# Patient Record
Sex: FEMALE | Race: WHITE | Employment: FULL TIME | URBAN - METROPOLITAN AREA
[De-identification: names, ages, dates, MRNs, and addresses within clinical notes are randomized per-mention and may not be internally consistent; named-entity substitution may affect disease eponyms.]

---

## 2019-10-09 ENCOUNTER — HOSPITAL ENCOUNTER (OUTPATIENT)
Dept: MAMMOGRAPHY | Age: 39
Discharge: HOME | End: 2019-10-09
Attending: NURSE PRACTITIONER
Payer: COMMERCIAL

## 2019-10-09 DIAGNOSIS — Z12.31 ENCOUNTER FOR SCREENING MAMMOGRAM FOR BREAST CANCER: ICD-10-CM

## 2019-10-09 PROCEDURE — 77063 BREAST TOMOSYNTHESIS BI: CPT

## 2020-12-28 ENCOUNTER — TRANSCRIBE ORDER (OUTPATIENT)
Dept: SCHEDULING | Age: 40
End: 2020-12-28

## 2020-12-28 DIAGNOSIS — Z12.31 ENCOUNTER FOR SCREENING MAMMOGRAM FOR MALIGNANT NEOPLASM OF BREAST: Primary | ICD-10-CM

## 2021-02-04 ENCOUNTER — TRANSCRIBE ORDER (OUTPATIENT)
Dept: SCHEDULING | Age: 41
End: 2021-02-04

## 2021-02-04 DIAGNOSIS — Z12.31 SCREENING MAMMOGRAM FOR HIGH-RISK PATIENT: Primary | ICD-10-CM

## 2021-04-12 ENCOUNTER — HOSPITAL ENCOUNTER (OUTPATIENT)
Dept: MAMMOGRAPHY | Age: 41
Discharge: HOME | End: 2021-04-12
Payer: COMMERCIAL

## 2021-04-12 DIAGNOSIS — Z12.31 SCREENING MAMMOGRAM FOR HIGH-RISK PATIENT: ICD-10-CM

## 2021-04-12 PROCEDURE — 77063 BREAST TOMOSYNTHESIS BI: CPT

## 2022-05-19 ENCOUNTER — TRANSCRIBE ORDER (OUTPATIENT)
Dept: SCHEDULING | Age: 42
End: 2022-05-19

## 2022-05-19 ENCOUNTER — HOSPITAL ENCOUNTER (OUTPATIENT)
Dept: MAMMOGRAPHY | Age: 42
Discharge: HOME | End: 2022-05-19
Attending: INTERNAL MEDICINE
Payer: COMMERCIAL

## 2022-05-19 DIAGNOSIS — Z12.31 VISIT FOR SCREENING MAMMOGRAM: Primary | ICD-10-CM

## 2022-05-19 DIAGNOSIS — Z12.31 VISIT FOR SCREENING MAMMOGRAM: ICD-10-CM

## 2022-05-19 PROCEDURE — 77063 BREAST TOMOSYNTHESIS BI: CPT

## 2022-10-29 ENCOUNTER — APPOINTMENT (OUTPATIENT)
Dept: GENERAL RADIOLOGY | Age: 42
End: 2022-10-29
Payer: COMMERCIAL

## 2022-10-29 ENCOUNTER — HOSPITAL ENCOUNTER (EMERGENCY)
Age: 42
Discharge: HOME | End: 2022-10-29
Attending: EMERGENCY MEDICINE
Payer: COMMERCIAL

## 2022-10-29 VITALS
OXYGEN SATURATION: 99 % | HEIGHT: 66 IN | DIASTOLIC BLOOD PRESSURE: 96 MMHG | RESPIRATION RATE: 18 BRPM | SYSTOLIC BLOOD PRESSURE: 128 MMHG | WEIGHT: 230 LBS | TEMPERATURE: 98.5 F | HEART RATE: 95 BPM | BODY MASS INDEX: 36.96 KG/M2

## 2022-10-29 DIAGNOSIS — S82.891A CLOSED FRACTURE OF RIGHT ANKLE, INITIAL ENCOUNTER: Primary | ICD-10-CM

## 2022-10-29 PROCEDURE — 73610 X-RAY EXAM OF ANKLE: CPT

## 2022-10-29 PROCEDURE — 99283 EMERGENCY DEPT VISIT LOW MDM: CPT

## 2022-10-29 RX ORDER — HYDROCODONE BITARTRATE AND ACETAMINOPHEN 5; 325 MG/1; MG/1
1 TABLET ORAL EVERY 6 HOURS PRN
Qty: 10 TABLET | Refills: 0 | Status: SHIPPED | OUTPATIENT
Start: 2022-10-29 | End: 2022-11-01

## 2022-10-29 ASSESSMENT — PAIN DESCRIPTION - ORIENTATION: ORIENTATION: RIGHT

## 2022-10-29 ASSESSMENT — PAIN DESCRIPTION - DESCRIPTORS: DESCRIPTORS: DISCOMFORT

## 2022-10-29 ASSESSMENT — PAIN - FUNCTIONAL ASSESSMENT: PAIN_FUNCTIONAL_ASSESSMENT: 0-10

## 2022-10-29 ASSESSMENT — ENCOUNTER SYMPTOMS: SHORTNESS OF BREATH: 0

## 2022-10-29 ASSESSMENT — PAIN SCALES - GENERAL: PAINLEVEL_OUTOF10: 7

## 2022-10-29 ASSESSMENT — PAIN DESCRIPTION - LOCATION: LOCATION: ANKLE

## 2022-10-29 NOTE — ED PROVIDER NOTES
chief complaint of    Chief Complaint   Patient presents with    Ankle Pain      25-year-old female presents emergency department today with complaint of right ankle pain. Patient states that she stepped in a hole, twisting her right ankle. She denies any other injury. Pain is worse with ambulation. Pain is improved at rest.  She denies any treatment prior to arrival.    The history is provided by the patient. Review of Systems   Constitutional:  Negative for fever. Respiratory:  Negative for shortness of breath. Cardiovascular:  Negative for chest pain. Musculoskeletal:  Positive for arthralgias. All other systems reviewed and are negative. Past Medical History:   Diagnosis Date    Hypertension     Psychiatric disorder     ADD        Past Surgical History:   Procedure Laterality Date    CHOLECYSTECTOMY      GYN      C-sec- had SA    HEENT      tonsils        Family History   Problem Relation Age of Onset    Hypertension Mother     Colon Cancer Neg Hx     Cancer Mother 36        Breast cancer    Heart Disease Father     Breast Cancer Mother 36    Ovarian Cancer Neg Hx         Social History     Socioeconomic History    Marital status:    Tobacco Use    Smoking status: Never    Smokeless tobacco: Never   Substance and Sexual Activity    Alcohol use: No    Drug use: No         Sulfa antibiotics and Nickel     Discharge Medication List as of 10/29/2022  5:35 PM        CONTINUE these medications which have NOT CHANGED    Details   Lisdexamfetamine Dimesylate 60 MG CAPS Take 1 tablet by mouth. Historical Med      lisinopril (PRINIVIL;ZESTRIL) 20 MG tablet Take by mouth dailyHistorical Med      norgestimate-ethinyl estradiol (ORTHO-CYCLEN) 0.25-35 MG-MCG per tablet Take 1 tablet by mouth dailyHistorical Med      promethazine (PHENERGAN) 12.5 MG tablet Take 12.5 mg by mouth every 6 hours as neededHistorical Med      rOPINIRole (REQUIP) 0.5 MG tablet Take 0.5 mg by mouth dailyHistorical Med Vitals signs and nursing note reviewed. Patient Vitals for the past 4 hrs:   Temp Pulse Resp BP SpO2   10/29/22 1503 98.5 °F (36.9 °C) 95 18 (!) 128/96 99 %          Physical Exam  Vitals and nursing note reviewed. Constitutional:       General: She is not in acute distress. Appearance: Normal appearance. She is well-developed. She is not ill-appearing, toxic-appearing or diaphoretic. HENT:      Head: Normocephalic and atraumatic. Mouth/Throat:      Mouth: Mucous membranes are moist.   Eyes:      General: No scleral icterus. Extraocular Movements: Extraocular movements intact. Cardiovascular:      Rate and Rhythm: Normal rate. Pulses:           Dorsalis pedis pulses are 2+ on the right side. Posterior tibial pulses are 2+ on the right side. Pulmonary:      Effort: Pulmonary effort is normal. No respiratory distress. Abdominal:      General: Abdomen is flat. There is no distension. Musculoskeletal:      Right ankle: Swelling present. Tenderness present. Decreased range of motion. Normal pulse. Comments: Swelling and tenderness to the lateral right ankle. Range of motion is decreased secondary to pain. Patient has intact pedal pulses, capillary refill, and sensation to the right lower extremity. No wounds noted. Skin:     General: Skin is warm and dry. Capillary Refill: Capillary refill takes less than 2 seconds. Neurological:      General: No focal deficit present. Mental Status: She is alert and oriented to person, place, and time. Psychiatric:         Mood and Affect: Mood normal.         Behavior: Behavior normal.        Procedures    Results for orders placed or performed during the hospital encounter of 10/29/22   XR ANKLE RIGHT (MIN 3 VIEWS)    Narrative    Right ankle series    HISTORY:  Pain and swelling after fall    3 views were obtained. COMPARISON: None.     FINDINGS: There is a nondisplaced fracture at the tip of the lateral malleolus. There is moderate anterolateral soft tissue swelling. There is no significant  soft tissue swelling. There is a tiny plantar calcaneal spur. Impression    Nondisplaced fracture at the tip of the lateral malleolus. XR ANKLE RIGHT (MIN 3 VIEWS)   Final Result   Nondisplaced fracture at the tip of the lateral malleolus. Voice dictation software was used during the making of this note. This software is not perfect and grammatical and other typographical errors may be present. This note has not been completely proofread for errors.        Lakshmi Smith, SIMONE - [place]  10/29/22 0329

## 2022-10-29 NOTE — ED NOTES
I have reviewed discharge instructions with the patient and spouse. The patient and spouse verbalized understanding. Patient left ED via Discharge Method: wheelchair to Home with spouse    Opportunity for questions and clarification provided. Patient given 1 [employer]. To continue your aftercare when you leave the hospital, you may receive an automated call from our care team to check in on how you are doing. This is a free service and part of our promise to provide the best care and service to meet your aftercare needs.  If you have questions, or wish to unsubscribe from this service please call [phone]. Thank you for Choosing our [facility] Emergency Department.         Inna Mann RN  10/29/22 6233
No

## 2022-10-29 NOTE — DISCHARGE INSTRUCTIONS
Wear the boot when you are up in walking. Elevate your ankle when at rest.  Apply ice for 10 to 15 minutes every 3-4 hours. Take medication as prescribed if needed. Follow-up with orthopedics. Return to the emergency department for any new, worsening, or concerning symptoms.

## 2022-10-29 NOTE — ED TRIAGE NOTES
Patient to triage via wheelchair with mechanical fall and right ankle pain.  Reports \"I heard a pop\"

## 2022-10-29 NOTE — Clinical Note
Irene Connelly was seen and treated in our emergency department on 10/29/2022. She may return to work on 11/02/2022. She may return sooner if better. If you have any questions or concerns, please don't hesitate to call.       [place] Kathleen, APRN - CNP

## 2022-11-01 ENCOUNTER — OFFICE VISIT (OUTPATIENT)
Dept: ORTHOPEDIC SURGERY | Age: 42
End: 2022-11-01

## 2022-11-01 DIAGNOSIS — S99.911A INJURY OF RIGHT ANKLE, INITIAL ENCOUNTER: Primary | ICD-10-CM

## 2022-11-01 DIAGNOSIS — S82.61XA CLOSED DISPLACED FRACTURE OF LATERAL MALLEOLUS OF RIGHT FIBULA, INITIAL ENCOUNTER: ICD-10-CM

## 2022-11-01 NOTE — LETTER
[address],[facility] [address]  Phone: [phone]  Fax: [phone]    Anel Lion MD        November 1, 2022     Patient: Ahmet Serrano   YOB: 1980   Date of Visit: 11/1/2022       To Whom It May Concern: It is my medical opinion that Steven Osei Work Status: out of work from 10/31/22-11/06/22. If you have any questions or concerns, please don't hesitate to call.     Sincerely,        Anel Lion MD

## 2022-11-01 NOTE — PROGRESS NOTES
Name: Melisa Womack  YOB: 1980  Gender: female  MRN: [id number]    CC: Right ankle injury    HPI:   Chronic history of bilateral ankle sprains. Last sprains/injuries: Left 2014: Right 2019  10/29/2022: She reports injuring her ankle stepping in a hole  10/29/2022: [facility] ER: 3D walking boot  11/01/2022: She presents to assess her right ankle injury    ROS/Meds/PSH/PMH/FH/SH: reviewed today    Tobacco:  reports that she has never smoked. She has never used smokeless tobacco.     Physical Examination:  Patient appears to be alert and oriented with acceptable appearance. No obvious distress or SOB  CV: appears to have acceptable vascular color and capillary refill  Neuro: appears to have mostly intact light touch sensation   Skin: Right ankle swelling mainly lateral  MS: Standing: None: Gait 3D boot  Right = mild medial ankle/Deltoid pain  Right = majority lateral malleolar tip pain  Right = has ankle/foot motion; 5/5 strength; unable to fully stress test    XR: Right side: Standing AP lateral mortise ankle plus AP oblique foot taken on return  XR Impression:  As above      Reviewed Test/Records/Documents:   10/29/2022: [facility] ER treated right ankle injury with 3D boot  10/29/2022: [facility] ER x-ray radiologic impression: Nondisplaced fracture at the tip of the lateral malleolus  10/29/2022: 3 views right ankle nonweightbearing with distal lateral malleolar sleeve avulsion fracture  02/22/2014: Left ankle x-rays with distal tip lateral malleolar avulsion ossicle    Assessment:   Chronic bilateral ankle sprains/injuries  10/29/2022: Acute right ankle injury: Lateral malleolar sleeve avulsion fracture; mild Deltoid sprain    Plan:   The patient and I discussed the above assessment. We explored treatment options.      She reports chronic bilateral ankle sprains/injuries  At this time the plan is to treat her acute injury with plans in the future for rehab, possible MRI scan and possible surgery  Advanced medical imaging: No indication today for MRI scan    DME: She has a short 3D boot: Issued a Reparel sleeve and lace up ankle brace  We discussed ankle care and boot or brace protection  PT: She will need PT in the future  Orthotic/prosthetic: No indication for custom insoles       Medication - OTC meds prn:   Surgical discussion: Discussed potential future right lateral ankle reconstruction  Follow up: 3 weeks: X-rays ankle/foot  Work status: Out of work this week: She may begin regular work 11/07/2022  History and discussion of management with: Her     This note was created using Dragon voice recognition software which may result in errors of speech and spelling recognition and word/phrase syntax errors.

## 2022-11-01 NOTE — LETTER
DME Patient Authorization Form    Name: Rocky Walters  : 1980  MRN: [id number]   Age: 38 y.o. Gender: female  Delivery Address: [facility]     Diagnosis:     ICD-10-CM    1. Injury of right ankle, initial encounter  S99.911A       2. Closed displaced fracture of lateral malleolus of right fibula, initial encounter  S82. 61XA            Requested DME:  Tam Jacobs -  ($200.00) X 1 - right        Clinical Notes:     **Indicates non-covered items by insurance. Payment expected on date of service. Electronically signed by  Provider: Maximino Dominguez MD__Date: 2022                            [facility]/[address] Tax ID # [id number]        Durable Medical Equipment and/or Orthotics Patient Consent     I understand that my physician has prescribed this medical supply as part of my treatment plan as a matter of Medical Necessity.  I understand that I have a choice in where I receive my prescribed orthopedic supplies and/or services.  I authorize [facility] to furnish this service/product and to provide my insurance carrier with any information requested in order to process for payment.  I instruct my insurance carrier to pay [facility] directly for these services/products.  I understand that my insurance carrier may deny payment for this supply because it is a non-covered item, deemed not medically necessary or considered experimental.   I understand that any cost not covered by my insurance carrier will be solely my financial responsibility.  I have received the Supplier Standards and have reviewed them.  I have received the prescribed item and have been fully instructed on the proper use of the above services/products.    ______ (Patient Initials) I understand that all DME items are non-returnable after being dispensed.  Items still in sealed packaging may be your requests for treatment or service. You have the right to talk in confidence with health care providers and to have your health care information protected. You have the right to receive an explanation of your bill. You have the right to complain about the service or product you receive. Patient Responsibilities:  Please provide complete and accurate information about your health insurance benefits and make arrangements for the timely payment of your bill. [facility] will, if possible, assume responsibility for billing your insurance (Medicare, Medicaid and commercial) for the prescribed equipment or devices. If your policy does not cover the prescribed product, or only covers a portion of the bill, you are responsible for any remaining balance. Return and Exchange Policy:  [facility] will honor published  Warranties for products. [facility] will accept returns or exchanges within 14 days from the date of receipt, providin) the product must be in new condition; 2) receipt as required; and 3) used disposable and hygiene products may only be returned due to a defective product. Note: Refunds will be issued in a timely manner, please allow 4-6 weeks for processing. Complaint Procedures and DME Consumer Protection Resources:  [facility] values you as a customer, and is committed to resolving patient concerns. This commitment includes understanding and documenting your concerns, conducting a review of internal procedures, and providing you with an explanation and resolution to your concerns. Should you have any questions about our services or billing process, please contact our office at (practice phone number). If we are unable to resolve the concern, you have the right to direct comments to the office of Consumer Protection, in the [address] or the [facility] office, without fear of repercussion.     DMEPOS SUPPLIER STANDARDS    A supplier must be in compliance with all applicable Federal and [facility] and regulatory requirements. A supplier must provide complete and accurate information on the DMEPOS supplier application. Any changes to this information must be reported to the [facility] within 30 days. An authorized individual (one whose signature is binding) must sign the application for billing privileges. A supplier must fill orders from its own inventory, or must contract with other companies for the purchase of items necessary to fill the order. A supplier may not contract with any entity that is currently excluded from the Medicare program, any [facility] program, or from any other Federal procurement or Nonprocurement programs. A supplier must advise beneficiaries that they may rent or purchase inexpensive or routinely purchased durable medical equipment, and of the purchase option for capped rental equipment. A supplier must notify beneficiaries of warranty coverage and honor all warranties under applicable State Law, and repair or replace free of charge Medicare covered items that are under warranty. A supplier must maintain a physical facility on an appropriate site. A supplier must permit CMS, or its agents to conduct on-site inspections to ascertain the supplier's compliance with these standards. The supplier location must be accessible to beneficiaries during reasonable business hours, and must maintain a visible sign and posted hours of operation. A supplier must maintain a primary business telephone listed under the name of the business in a [employer] or a toll free number available through directory assistance. The exclusive use of a beeper, answering machine or cell phone is prohibited. A supplier must have comprehensive liability insurance in the amount of at least $300,000 that covers both the supplier's place of business and all customers and employees of the supplier.   If the supplier manufactures its own items, this insurance must also cover product liability and completed operations. A supplier must agree not to initiate telephone contact with beneficiaries, with a few exceptions allowed. This standard prohibits suppliers from calling beneficiaries in order to solicit new business. A supplier is responsible for delivery and must instruct beneficiaries on use of Medicare covered items, and maintain proof of delivery. A supplier must answer questions, and respond to complaints of the beneficiaries, and maintain documentation of such contacts. A supplier must maintain and replace at no charge or repair directly, or through a service contract with another company, Medicare covered items it has rented to beneficiaries. A supplier must accept returns of substandard (less than full quality for the particular item) or unsuitable items (inappropriate for the beneficiary at the time it was fitted and rented or sold) from beneficiaries. A supplier must disclose these supplier standards to each beneficiary to whom it supplies a Medicare-covered item. A supplier must disclose to the government any person having ownership, financial, or control interest in the supplier. A supplier must not convey or reassign a supplier number; i.e., the supplier may not sell or allow another entity to use its Medicare billing number. A supplier must have a complaint resolution protocol established to address beneficiary complaints that relate to these standards. A record of these complaints must be maintained at the physical facility. Complaint records must include: the name, address, telephone number and health insurance claim number of the beneficiary, a summary of the complaint, and any action taken to resolve it. A supplier must agree to furnish CMS any information required by the Medicare statute and implementing regulations.   A supplier of DMEPOS and other items and services must be accredited by a CMS-approved accreditation organization in order to receive and retain a supplier billing number. The accreditation must indicate the specific products and services, for which the supplier is accredited in order for the supplier to receive payment for those specific products and services. A DMEPOS supplier must notify their accreditation organization when a new DMEPOS location is opened. The accreditation organization may accredit the new supplier location for three months after it is operational without requiring a new site visit. All DMEPOS supplier locations, whether owned or subcontracted, must meet the [employer] and be separately accredited in order to bill Medicare. An accredited supplier may be denied enrollment or their enrollment may be revoked, if CMS determines that they are not in compliance with the DMEPOS quality standards. A DMEPOS supplier must disclose upon enrollment all products and services, including the addition of new product lines for which they are seeking accreditation. If a new product line is added after enrollment, the DMEPOS supplier will be responsible for notifying the accrediting body of the new product so that the DMEPOS supplier can be re-surveyed and accredited for these new products. Must meet the surety bond requirements specified in 42 C. F.R. 424.57(c). Implementation date- May 4, 2009. A supplier must obtain oxygen from a state-licensed oxygen supplier. A supplier must maintain ordering and referring documentation consistent with provisions found in 42 C. F.R. 424.516(f). DMEPOS suppliers are prohibited from sharing a practice location with certain other Medicare providers and suppliers. DMEPOS suppliers must remain open to the public for a minimum of 30 hours per week with certain exceptions. None

## 2022-11-02 NOTE — PROGRESS NOTES
Patient was fitted and instructed on Reparel Ankle Sleeve for the right foot. The patient was prescribed a Wraptor brace for the patient's rightfoot. The patient wears a size NA shoe and I fitted the patient with a L brace. I explained how to fit the brace properly by pulling the lace tabs across top of foot first then under arch and lastly pulling the strap up firmly and attaching to the lateral Velcro strip. Thus forming a figure 8 across the ankle joint. Once the figure 8 is completed they are to secure the top (short circumferential) straps to help avoid the straps from loosening with normal wear. The patient was able to demonstrate proper fitting in office to ensure compliance with device and acknowledged satisfaction with current fit. Patient read and signed documenting they understand and agree to [facility]'s current DME return policy.

## 2022-11-21 ENCOUNTER — OFFICE VISIT (OUTPATIENT)
Dept: ORTHOPEDIC SURGERY | Age: 42
End: 2022-11-21
Payer: COMMERCIAL

## 2022-11-21 DIAGNOSIS — S99.911A INJURY OF RIGHT ANKLE, INITIAL ENCOUNTER: Primary | ICD-10-CM

## 2022-11-21 DIAGNOSIS — S82.61XA CLOSED DISPLACED FRACTURE OF LATERAL MALLEOLUS OF RIGHT FIBULA, INITIAL ENCOUNTER: ICD-10-CM

## 2022-11-21 PROCEDURE — 99213 OFFICE O/P EST LOW 20 MIN: CPT | Performed by: ORTHOPAEDIC SURGERY

## 2022-11-21 NOTE — PROGRESS NOTES
Name: Elisa Lentz  YOB: 1980  Gender: female  MRN: [id number]    11/01/2022: Initial visit with me for[de-identified] Right ankle injury  11/21/2022: She presents full weightbearing 3D boot    HPI:   Chronic history of bilateral ankle sprains. Last sprains/injuries: Left 2014: Right 2019  10/29/2022: She reports injuring her ankle stepping in a hole  10[facility]/2022: [facility] 3D walking boot  11/01/2022: She presented to assess her right ankle injury    ROS/Meds/PSH/PMH/FH/SH: reviewed today    Tobacco:  reports that she has never smoked. She has never used smokeless tobacco.     Physical Examination:  Patient appears to be alert and oriented with acceptable appearance. No obvious distress or SOB  CV: appears to have acceptable vascular color and capillary refill  Neuro: appears to have mostly intact light touch sensation   Skin: Right lateral ankle swelling   MS: Standing: Mild bilateral varus posturing but no true cavovarus: Gait full in 3D boot  Right = distal tip lateral malleolar pain only; no syndesmotic pain; no medial pain; no foot pain    XR: Right side: Standing AP lateral mortise ankle plus AP oblique foot taken today with distal tip lateral malleolar sleeve avulsion fracture   XR Impression:  As above      Reviewed Test/Records/Documents:   10/29/2022: [facility] [place] right ankle injury with 3D boot  10/29/2022: [facility] x-ray radiologic impression: Nondisplaced fracture at the tip of the lateral malleolus  10/29/2022: 3 views right ankle nonweightbearing with distal lateral malleolar sleeve avulsion fracture  02/22/2014: Left ankle x-rays with distal tip lateral malleolar avulsion ossicle    Assessment:   Chronic bilateral ankle sprains/injuries  10/29/2022: Acute right ankle injury: Lateral malleolar sleeve avulsion fracture     Plan:   The patient and I discussed the above assessment. We explored treatment options.      I believe she will heal via ligament stability around her lateral malleolar sleeve fracture  She reports chronic bilateral ankle sprains/injuries and that may be due to her mild varus posturing  Potential future for Vasyli plus Ely insoles but hold today. At this time the plan is to treat her acute injury with plans in the future for rehab, possible MRI scan and possible surgery  Advanced medical imaging: No indication today for MRI scan    She will wean her short 3D boot into Reparel sleeve and lace up ankle brace  PT: No indication today  Orthotic/prosthetic: No indication for custom insoles; potential future Vasyli plus Ely    Medication - OTC meds prn:   Surgical discussion: Discussed potential future right lateral ankle reconstruction  Follow up: 4 weeks: X-rays ankle/foot  Work status: Regular    This note was created using Dragon voice recognition software which may result in errors of speech and spelling recognition and word/phrase syntax errors.

## 2022-12-21 ENCOUNTER — OFFICE VISIT (OUTPATIENT)
Dept: ORTHOPEDIC SURGERY | Age: 42
End: 2022-12-21

## 2022-12-21 VITALS — HEIGHT: 66 IN | WEIGHT: 230 LBS | BODY MASS INDEX: 36.96 KG/M2

## 2022-12-21 DIAGNOSIS — S82.61XG CLOSED DISPLACED FRACTURE OF LATERAL MALLEOLUS OF RIGHT FIBULA WITH DELAYED HEALING, SUBSEQUENT ENCOUNTER: ICD-10-CM

## 2022-12-21 DIAGNOSIS — S99.911D INJURY OF RIGHT ANKLE, SUBSEQUENT ENCOUNTER: Primary | ICD-10-CM

## 2022-12-21 NOTE — PROGRESS NOTES
Name: Jeanine Pelaez  YOB: 1980  Gender: female  MRN: [id number]    11/01/2022: Initial visit with me for[de-identified] Right ankle injury  11/21/2022: Last visit full weightbearing 3D boot  12/21/2022: She continues to use the 3D boot. She reports sharp acute medial ankle pain and chronic persistent lateral ankle pain     HPI:   Chronic history of bilateral ankle sprains. Last sprains/injuries: Left 2014: Right 2019  10/29/2022: She reports injuring her ankle stepping in a hole  10/29/2022: [facility] ER: 3D walking boot  11/01/2022: She presented to assess her right ankle injury    ROS/Meds/PSH/PMH/FH/SH: reviewed today    Tobacco:  reports that she has never smoked. She has never used smokeless tobacco.     Physical Examination:  Patient appears to be alert and oriented with acceptable appearance.   No obvious distress or SOB  CV: appears to have acceptable vascular color and capillary refill  Neuro: appears to have mostly intact light touch sensation   Skin: Right lateral ankle swelling   MS: Standing: Mild bilateral varus posturing but no true cavovarus: Gait full in 3D boot  Right = lateral > medial ankle pain; no syndesmotic pain; no foot pain; 5/5 strength    XR: Right side: Standing AP lateral mortise ankle plus AP oblique foot taken today with distal tip lateral malleolar sleeve avulsion fracture; accessory navicular; os peroneum  XR Impression:  As above      Reviewed Test/Records/Documents:   10/29/2022: [facility] ER treated right ankle injury with 3D boot  10/29/2022: [facility] ER x-ray radiologic impression: Nondisplaced fracture at the tip of the lateral malleolus  10/29/2022: 3 views right ankle nonweightbearing with distal lateral malleolar sleeve avulsion fracture  02/22/2014: Left ankle x-rays with distal tip lateral malleolar avulsion ossicle    Assessment:   Chronic bilateral ankle sprains/injuries  10/29/2022: Acute right ankle injury: Lateral malleolar sleeve avulsion fracture     Plan:   The patient and I discussed the above assessment. We explored treatment options. I believe her fracture will heal via ligament stability around her lateral malleolar sleeve fracture, but she continues to have enough medial and lateral ankle pain that MRI scan is indicated  She reports chronic bilateral ankle sprains/injuries and that may be due to her mild varus posturing, but no 1st metatarsal plantar flexion on the lateral film     Advanced medical imaging: Left ankle MRI scan: History of chronic bilateral ankle sprains/injuries: Acute injury 10/29/2022 with lateral malleolar sleeve avulsion fracture; assess for lateral talar dome OCD, peroneal tendon tear, talar bone edema    We discussed ankle protection and weaning gradually 3D boot into Reparel sleeve and lace up ankle brace if pain allows  PT: Depends on MRI scan  Orthotic/prosthetic: No indication for custom insoles or Vasyli plus [place]    Medication - OTC meds prn:   Surgical discussion: Discussed potential future: Right lateral malleolar bone resection; lateral ankle reconstruction  Follow up: After MRI scan  Work status: Regular    This note was created using Dragon voice recognition software which may result in errors of speech and spelling recognition and word/phrase syntax errors.

## 2023-01-06 ENCOUNTER — OFFICE VISIT (OUTPATIENT)
Dept: ORTHOPEDIC SURGERY | Age: 43
End: 2023-01-06

## 2023-01-06 VITALS — BODY MASS INDEX: 36.96 KG/M2 | HEIGHT: 66 IN | WEIGHT: 230 LBS

## 2023-01-06 DIAGNOSIS — S99.911D INJURY OF RIGHT ANKLE, SUBSEQUENT ENCOUNTER: Primary | ICD-10-CM

## 2023-01-06 NOTE — PROGRESS NOTES
Name: Vernon Parker  YOB: 1980  Gender: female  MRN: [id number]    12/21/2022: Last visit with me  01/06/2023: She presents to discuss her MRI scan    HPI:   Chronic history of bilateral ankle sprains. Last sprains/injuries: Left 2014: Right 2019  10/29/2022: She reports injuring her ankle stepping in a hole  10/29/2022: [facility] ER: 3D walking boot  11/01/2022: Initial visit with me for: Right ankle injury     ROS/Meds/PSH/PMH/FH/SH: reviewed today    Tobacco:  reports that she has never smoked. She has never used smokeless tobacco.     Physical Examination:  Patient appears to be alert and oriented with acceptable appearance. No obvious distress or SOB  CV: appears to have acceptable vascular color and capillary refill  Neuro: appears to have mostly intact light touch sensation   Skin: Less right lateral ankle swelling   MS: Standing: Mild bilateral varus posturing but no true cavovarus: Gait in regular shoes  Right = lateral > medial ankle pain; no syndesmotic pain; no foot pain; 5/5 strength    XR: Right side: Standing AP lateral mortise ankle plus AP oblique foot taken 12/21/2022 with distal tip lateral malleolar sleeve avulsion fracture; accessory navicular; os peroneum  XR Impression:  As above      Reviewed Test/Records/Documents:   10/29/2022: [facility] ER treated right ankle injury with 3D boot  10/29/2022: [facility] ER x-ray radiologic impression: Nondisplaced fracture at the tip of the lateral malleolus  10/29/2022: 3 views right ankle nonweightbearing with distal lateral malleolar sleeve avulsion fracture  02/22/2014: Left ankle x-rays with distal tip lateral malleolar avulsion ossicle    12/29/2022: Right ankle MRI scan without contrast: Radiologic impression:  1. Tiny avulsion fracture of the distal tip of the medial malleolus [should reflect lateral malleolus] on  2. High-grade partial to complete tear of the ATFL and intermediate grade partial tearing of the CFL  3.   Intermediate grade partial tearing of the deep fibers of the deltoid ligament  4. Bone marrow edema-like signal within the medial talar shoulder/body extending into the neck suggesting bone contusion  5. Bone marrow edema-like signal of the talus extends to the articular surface without definite findings of osteochondral abnormality or full-thickness chondral defect  6. Small tibiotalar and subtalar joint effusions    Assessment:   Chronic bilateral ankle sprains/injuries  10/29/2022: Acute right ankle injury: Deltoid strain: Lateral ankle sprains/malleolar sleeve avulsion fracture; talar bone bruise;    Plan:   The patient and I discussed the above assessment. We explored treatment options. Her MRI scan explains her persistent symptoms  I believe she will recover all findings on the MRI scan, but the only wildcard is the possibility of developing an osteochondral defect  We discussed ankle protection with either boot or brace  PT: No indication for formal PT  Orthotic/prosthetic: No indication for custom insoles or Vasyli plus [place]    Medication - OTC meds prn:   Surgical discussion: No indication today  Follow up: 8 weeks: X-rays ankle and foot  Work status: Regular    This note was created using Dragon voice recognition software which may result in errors of speech and spelling recognition and word/phrase syntax errors.

## 2023-05-25 ENCOUNTER — HOSPITAL ENCOUNTER (OUTPATIENT)
Dept: MAMMOGRAPHY | Age: 43
Discharge: HOME | End: 2023-05-25
Payer: COMMERCIAL

## 2023-05-25 DIAGNOSIS — Z12.31 SCREENING MAMMOGRAM FOR HIGH-RISK PATIENT: ICD-10-CM

## 2023-05-25 PROCEDURE — 77063 BREAST TOMOSYNTHESIS BI: CPT

## 2023-06-19 ENCOUNTER — OFFICE VISIT (OUTPATIENT)
Dept: ORTHOPEDIC SURGERY | Age: 43
End: 2023-06-19
Payer: COMMERCIAL

## 2023-06-19 VITALS — WEIGHT: 230 LBS | BODY MASS INDEX: 36.96 KG/M2 | HEIGHT: 66 IN

## 2023-06-19 DIAGNOSIS — M79.671 RIGHT FOOT PAIN: ICD-10-CM

## 2023-06-19 DIAGNOSIS — M25.571 RIGHT ANKLE PAIN, UNSPECIFIED CHRONICITY: Primary | ICD-10-CM

## 2023-06-19 PROCEDURE — 99214 OFFICE O/P EST MOD 30 MIN: CPT | Performed by: ORTHOPAEDIC SURGERY

## 2023-06-19 RX ORDER — PREDNISONE 5 MG/1
TABLET ORAL
Qty: 1 EACH | Refills: 2 | Status: SHIPPED | OUTPATIENT
Start: 2023-06-19

## 2023-06-19 NOTE — PROGRESS NOTES
Name: Emelina Topete  YOB: 1980  Gender: female  MRN: [id number]     CC: Right ankle pain    HPI:   Chronic history of bilateral ankle sprains. 10/29/2022: She reports injuring her ankle stepping in a hole  [place] [facility] [place]  11/01/2022: Initial visit with me for: Right ankle injury   01/06/2023: Last visit with me: Diagnoses:  Chronic bilateral ankle sprains/injuries  10/29/2022: Acute right ankle injury: Deltoid strain: Lateral ankle sprains/malleolar sleeve avulsion fracture; talar bone bruise  June 2023: Right plantar medial foot pain progressed to entire ankle pain  06/19/2023: Initial visit: Right outer ankle pain    ROS/Meds/PSH/PMH/FH/SH: reviewed today    Tobacco:  reports that she has never smoked. She has never used smokeless tobacco.     Physical Examination:  Patient appears to be alert and oriented with acceptable appearance. No obvious distress or SOB  CV: appears to have acceptable vascular color and capillary refill  Neuro: appears to have mostly intact light touch sensation   Skin: No gross soft tissue swelling  MS: Standing: Plantigrade: Gait slightly protected  Right = very mild plantar medial heel/plantar fascia pain  Right = lateral peroneal palpation and neuralgia  Right = full ankle/foot motor; 5/5 strength; no instability or crepitance    XR: Right: Standing AP lateral mortise ankle plus AP oblique foot taken today with lateral malleolar tip nonunion; os peroneal; fourth metatarsal tibial density  XR Impression:  As above      Reviewed Test/Records/Documents:   12/29/2022: Right ankle MRI scan without contrast: Radiologic impression:  1. Tiny avulsion fracture of the distal tip of the medial malleolus [should reflect lateral malleolus] on  2. High-grade partial to complete tear of the ATFL and intermediate grade partial tearing of the CFL  3. Intermediate grade partial tearing of the deep fibers of the deltoid ligament  4.   Bone marrow

## 2023-07-03 ENCOUNTER — TELEPHONE (OUTPATIENT)
Dept: ORTHOPEDIC SURGERY | Age: 43
End: 2023-07-03

## 2023-07-03 NOTE — TELEPHONE ENCOUNTER
Called and left message for pt about cx her appt with Dr. Faye Ball at this time. We will call pt back to r/s when we know where to put her.

## 2023-07-05 ENCOUNTER — TELEPHONE (OUTPATIENT)
Dept: ORTHOPEDIC SURGERY | Age: 43
End: 2023-07-05

## 2023-07-06 ENCOUNTER — TELEPHONE (OUTPATIENT)
Dept: ORTHOPEDIC SURGERY | Age: 43
End: 2023-07-06

## 2023-07-07 ENCOUNTER — TELEPHONE (OUTPATIENT)
Dept: ORTHOPEDIC SURGERY | Age: 43
End: 2023-07-07

## 2023-07-10 ENCOUNTER — TELEPHONE (OUTPATIENT)
Dept: ORTHOPEDIC SURGERY | Age: 43
End: 2023-07-10

## 2023-07-19 ENCOUNTER — TELEPHONE (OUTPATIENT)
Dept: ORTHOPEDIC SURGERY | Age: 43
End: 2023-07-19

## 2023-07-19 NOTE — TELEPHONE ENCOUNTER
LM for pt to call the office back. Wanted to ask how pt was feeling and ask if they would like to reschedule their cancelled apt.

## 2023-10-23 ENCOUNTER — OFFICE VISIT (OUTPATIENT)
Dept: ORTHOPEDIC SURGERY | Age: 43
End: 2023-10-23

## 2023-10-23 VITALS — HEIGHT: 66 IN | WEIGHT: 230 LBS | BODY MASS INDEX: 36.96 KG/M2

## 2023-10-23 DIAGNOSIS — M79.671 PAIN OF RIGHT HEEL: ICD-10-CM

## 2023-10-23 DIAGNOSIS — M72.2 PLANTAR FASCIITIS: Primary | ICD-10-CM

## 2023-10-23 RX ORDER — METHYLPREDNISOLONE ACETATE 40 MG/ML
40 INJECTION, SUSPENSION INTRA-ARTICULAR; INTRALESIONAL; INTRAMUSCULAR; SOFT TISSUE ONCE
Status: COMPLETED | OUTPATIENT
Start: 2023-10-23 | End: 2023-10-23

## 2023-10-23 RX ADMIN — METHYLPREDNISOLONE ACETATE 40 MG: 40 INJECTION, SUSPENSION INTRA-ARTICULAR; INTRALESIONAL; INTRAMUSCULAR; SOFT TISSUE at 15:53

## 2023-10-24 NOTE — PROGRESS NOTES
The patient was prescribed and fitted with an Aircast Airheel for the right foot, size medium. She was also fitted with an ankle sleeve for the right foot, size S/M. Patient read and signed documenting they understand and agree to [facility]'s current DME return policy.
talar shoulder/body extending into the neck suggesting bone contusion  5. Bone marrow edema-like signal of the talus extends to the articular surface without definite findings of osteochondral abnormality or full-thickness chondral defect  6. Small tibiotalar and subtalar joint effusions     Injection: We discussed risks/complications of injection decided proceed: After sterile prep: Right plantar medial heel/plantar fascia injected 1 cc Xylocaine, 40 mg Depo-Medrol; she did well    Assessment:    Right plantar fasciitis, Baxters nerve entrapment  Right asymptomatic appearing lateral malleolar nonunion    Plan:   The patient and I discussed the above assessment. We explored treatment options. Plan today is injection, padding, OOFOS, Neurontin, HEP stretching  If no resolution, consider: Right ankle/hindfoot MRI scan: PT: Custom insoles    Advanced medical imaging: Right ankle MRI scan: Potential future  DME: Air heel: Incredible wear sleeve  We discussed care and protection  PT: Potential future, but start with Achilles stretching  Orthotic/prosthetic: Potential future custom heel PQ insoles, but start with OOFOS       Medication - OTC meds prn:   Completed prior prescribed: Prednisone 5 mg Sterapred pack; #48; 2 refills:  Prescribed Neurontin 100 mg; #90: 2 refills; 2 qhs; 1 qam  Surgical discussion: Discussed potential plantar fascia/nerve release  Follow up: 4  weeks  Work status: Regular     This note was created using Dragon voice recognition software which may result in errors of speech and spelling recognition and word/phrase syntax errors.

## 2023-10-25 RX ORDER — GABAPENTIN 100 MG/1
100 CAPSULE ORAL 3 TIMES DAILY
Qty: 90 CAPSULE | Refills: 2 | Status: SHIPPED | OUTPATIENT
Start: 2023-10-25 | End: 2024-01-23

## 2023-11-22 ENCOUNTER — OFFICE VISIT (OUTPATIENT)
Dept: ORTHOPEDIC SURGERY | Age: 43
End: 2023-11-22
Payer: COMMERCIAL

## 2023-11-22 DIAGNOSIS — M79.671 PAIN OF RIGHT HEEL: Primary | ICD-10-CM

## 2023-11-22 PROCEDURE — 99213 OFFICE O/P EST LOW 20 MIN: CPT | Performed by: ORTHOPAEDIC SURGERY

## 2023-11-22 NOTE — PROGRESS NOTES
Name: Joann Alves  YOB: 1980  Gender: female  MRN: [id number]     11/22/2023: Persistent right heel pain    HPI:   Chronic history of bilateral ankle sprains. 10/29/2022: She reports injuring her ankle stepping in a hole  10/29/2022: [facility] [place] [place]: 3D walking boot  11/01/2022: Initial visit with me for: Right ankle injury   01/06/2023: Last visit with me: Diagnoses:  Chronic bilateral ankle sprains/injuries  10/29/2022: Acute right ankle injury: Deltoid strain: Lateral ankle sprains/malleolar sleeve avulsion fracture; talar bone bruise  June 2023: Right plantar medial foot pain progressed to ankle pain  06/19/2023: Right outer ankle pain  10/23/2023: Right heel pain     ROS/Meds/PSH/PMH/FH/SH: reviewed today    Tobacco:  reports that she has never smoked. She has never used smokeless tobacco.     Physical Examination:  Patient appears to be alert and oriented with acceptable appearance. No obvious distress or SOB  CV: appears to have acceptable vascular color and capillary refill  Neuro: appears to have mostly intact light touch sensation   Skin: No gross soft tissue swelling  MS: Standing: Plantigrade: Gait protected first steps  Right = persistent plantar medial heel pain, but less Baxters nerve entrapment neuralgia  Right = full ankle/foot motion; 5/5 strength; no instability or crepitance    XR: Right: Standing AP lateral mortise ankle plus AP oblique foot taken 06/19/2023 with lateral malleolar tip nonunion; os peroneum; fourth metatarsal tibial density  XR Impression:  As above      Reviewed Test/Records/Documents:   12/29/2022: Right ankle MRI scan without contrast: Radiologic impression:  1. Tiny avulsion fracture of the distal tip of the medial malleolus [should reflect lateral malleolus] on  2. High-grade partial to complete tear of the ATFL and intermediate grade partial tearing of the CFL  3. Intermediate grade partial tearing of the deep fibers of the deltoid ligament  4.   Bone

## 2023-12-08 ENCOUNTER — OFFICE VISIT (OUTPATIENT)
Dept: ORTHOPEDIC SURGERY | Age: 43
End: 2023-12-08

## 2023-12-08 VITALS — HEIGHT: 65 IN | BODY MASS INDEX: 43.32 KG/M2 | WEIGHT: 260 LBS

## 2023-12-08 DIAGNOSIS — M25.571 RIGHT ANKLE PAIN, UNSPECIFIED CHRONICITY: ICD-10-CM

## 2023-12-08 DIAGNOSIS — M72.2 PLANTAR FASCIITIS: Primary | ICD-10-CM

## 2023-12-08 NOTE — PROGRESS NOTES
Name: Nithin Kathleen  YOB: 1980  Gender: female  MRN: [id number]     11/22/2023: Persistent right heel pain  12/08/2023: Here to discuss MRI scan    HPI:   Chronic history of bilateral ankle sprains. 10/29/2022: She reports injuring her ankle stepping in a hole  10/29/2022: [facility] ER: 3D walking boot  11/01/2022: Initial visit with me for: Right ankle injury   01/06/2023: Last visit with me: Diagnoses:  Chronic bilateral ankle sprains/injuries  10/29/2022: Acute right ankle injury: Deltoid strain: Lateral ankle sprains/malleolar sleeve avulsion fracture; talar bone bruise  June 2023: Right plantar medial foot pain progressed to ankle pain  06/19/2023: Right outer ankle pain  10/23/2023: Right heel pain     ROS/Meds/PSH/PMH/FH/SH: reviewed today    Tobacco:  reports that she has never smoked. She has never used smokeless tobacco.     Physical Examination:  Patient appears to be alert and oriented with acceptable appearance. No obvious distress or SOB  CV: appears to have acceptable vascular color and capillary refill  Neuro: appears to have mostly intact light touch sensation   Skin: No gross soft tissue swelling  MS: Standing: Plantigrade: Gait protected first steps  Right = persistent plantar medial heel pain, but less Baxters nerve entrapment neuralgia  Right = full ankle/foot motion; 5/5 strength; no instability or crepitance    XR: Right: Standing AP lateral mortise ankle plus AP oblique foot taken 06/19/2023 with lateral malleolar tip nonunion; os peroneum; fourth metatarsal tibial density  XR Impression:  As above      Reviewed Test/Records/Documents:   12/29/2022: Right ankle MRI scan without contrast: Radiologic impression:  1. Tiny avulsion fracture of the distal tip of the medial malleolus [should reflect lateral malleolus] on  2. High-grade partial to complete tear of the ATFL and intermediate grade partial tearing of the CFL  3.   Intermediate grade partial tearing of the deep

## 2024-01-05 ENCOUNTER — CLINICAL DOCUMENTATION (OUTPATIENT)
Dept: ORTHOPEDIC SURGERY | Age: 44
End: 2024-01-05

## 2024-01-07 DIAGNOSIS — M25.371 RIGHT ANKLE INSTABILITY: Primary | ICD-10-CM

## 2024-01-07 DIAGNOSIS — M95.8 OSTEOCHONDRAL DEFECT OF TALUS: ICD-10-CM

## 2024-01-08 PROBLEM — M25.371 RIGHT ANKLE INSTABILITY: Status: ACTIVE | Noted: 2024-01-07

## 2024-01-08 PROBLEM — M95.8 OSTEOCHONDRAL DEFECT OF TALUS: Status: ACTIVE | Noted: 2024-01-07

## 2024-01-11 ENCOUNTER — TELEPHONE (OUTPATIENT)
Dept: ORTHOPEDIC SURGERY | Age: 44
End: 2024-01-11

## 2024-01-15 ENCOUNTER — CLINICAL DOCUMENTATION (OUTPATIENT)
Dept: ORTHOPEDIC SURGERY | Age: 44
End: 2024-01-15

## 2024-01-15 ENCOUNTER — TELEPHONE (OUTPATIENT)
Dept: ORTHOPEDIC SURGERY | Age: 44
End: 2024-01-15

## 2024-01-15 NOTE — TELEPHONE ENCOUNTER
She wants you to call her  with surgery instructions and the surgery time. His name is Eliazar and his number is [phone]. 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

## 2024-01-16 NOTE — PERIOP NOTE
Patient verified name and .  Order for consent found in EHR and matches case posting; patient verifies procedure.   Type 1B surgery, PAT phone assessment complete.  Orders received.  Labs per surgeon: none  Labs per anesthesia protocol: none indicated    Patient answered medical/surgical history questions at their best of ability. All prior to admission medications documented in EPIC.  Patient instructed to take the following medications the day of surgery according to anesthesia guidelines with a small sip of water: Vyvanse On the day before surgery please take 2 Tylenol in the morning and then again before bed. You may use either regular or extra strength.   Hold all vitamins 7 days prior to surgery and NSAIDS 5 days prior to surgery. Prescription meds to hold:none  Patient instructed on the following:    > Arrive at [facility] Entrance, time of arrival to be called the day before by 1700  > NPO after midnight, unless otherwise indicated, including gum, mints, and ice chips  > Responsible adult must drive patient to the hospital, stay during surgery, and patient will need supervision 24 hours after anesthesia  > Use non moisturizing soap in shower the night before surgery and on the morning of surgery  > All piercings must be removed prior to arrival.    > Leave all valuables (money and jewelry) at home but bring insurance card and ID on DOS.   > You may be required to pay a deductible or co-pay on the day of your procedure. You can pre-pay by calling [phone] if your surgery is at the [facility] or [phone] if your surgery is at the [facility].  > Do not wear make-up, nail polish, lotions, cologne, perfumes, powders, or oil on skin. Artificial nails are not permitted.

## 2024-01-17 NOTE — PERIOP NOTE
Preop department called to notify patient of arrival time for scheduled procedure. Instructions given to   - Arrive at [facility] Entrance [address].  - Remain NPO after midnight, unless otherwise indicated, including gum, mints, and ice chips.   - Have a responsible adult to drive patient to the hospital, stay during surgery, and patient will need supervision 24 hours after anesthesia.   - Use antibacterial soap in shower the night before surgery and on the morning of surgery.       Was patient contacted: yes  Voicemail left:   Numbers contacted: [phone]   Arrival time: 0900

## 2024-01-18 ENCOUNTER — ANESTHESIA (OUTPATIENT)
Dept: SURGERY | Age: 44
End: 2024-01-18
Payer: COMMERCIAL

## 2024-01-18 ENCOUNTER — ANESTHESIA EVENT (OUTPATIENT)
Dept: SURGERY | Age: 44
End: 2024-01-18
Payer: COMMERCIAL

## 2024-01-18 ENCOUNTER — HOSPITAL ENCOUNTER (OUTPATIENT)
Age: 44
Setting detail: OUTPATIENT SURGERY
Discharge: HOME | End: 2024-01-18
Attending: ORTHOPAEDIC SURGERY | Admitting: ORTHOPAEDIC SURGERY
Payer: COMMERCIAL

## 2024-01-18 VITALS
DIASTOLIC BLOOD PRESSURE: 79 MMHG | TEMPERATURE: 98 F | WEIGHT: 260 LBS | BODY MASS INDEX: 43.32 KG/M2 | RESPIRATION RATE: 15 BRPM | HEART RATE: 82 BPM | OXYGEN SATURATION: 98 % | HEIGHT: 65 IN | SYSTOLIC BLOOD PRESSURE: 148 MMHG

## 2024-01-18 DIAGNOSIS — G89.18 ACUTE POST-OPERATIVE PAIN: Primary | ICD-10-CM

## 2024-01-18 PROCEDURE — 2709999900 HC NON-CHARGEABLE SUPPLY: Performed by: ORTHOPAEDIC SURGERY

## 2024-01-18 PROCEDURE — 6360000002 HC RX W HCPCS: Performed by: NURSE PRACTITIONER

## 2024-01-18 PROCEDURE — 3600000004 HC SURGERY LEVEL 4 BASE: Performed by: ORTHOPAEDIC SURGERY

## 2024-01-18 PROCEDURE — 7100000001 HC PACU RECOVERY - ADDTL 15 MIN: Performed by: ORTHOPAEDIC SURGERY

## 2024-01-18 PROCEDURE — 7100000010 HC PHASE II RECOVERY - FIRST 15 MIN: Performed by: ORTHOPAEDIC SURGERY

## 2024-01-18 PROCEDURE — 6360000002 HC RX W HCPCS: Performed by: ANESTHESIOLOGY

## 2024-01-18 PROCEDURE — 7100000000 HC PACU RECOVERY - FIRST 15 MIN: Performed by: ORTHOPAEDIC SURGERY

## 2024-01-18 PROCEDURE — 64447 NJX AA&/STRD FEMORAL NRV IMG: CPT | Performed by: ANESTHESIOLOGY

## 2024-01-18 PROCEDURE — 2580000003 HC RX 258: Performed by: ANESTHESIOLOGY

## 2024-01-18 PROCEDURE — C1713 ANCHOR/SCREW BN/BN,TIS/BN: HCPCS | Performed by: ORTHOPAEDIC SURGERY

## 2024-01-18 PROCEDURE — 2720000010 HC SURG SUPPLY STERILE: Performed by: ORTHOPAEDIC SURGERY

## 2024-01-18 PROCEDURE — 3700000000 HC ANESTHESIA ATTENDED CARE: Performed by: ORTHOPAEDIC SURGERY

## 2024-01-18 PROCEDURE — 3700000001 HC ADD 15 MINUTES (ANESTHESIA): Performed by: ORTHOPAEDIC SURGERY

## 2024-01-18 PROCEDURE — 6360000002 HC RX W HCPCS: Performed by: NURSE ANESTHETIST, CERTIFIED REGISTERED

## 2024-01-18 PROCEDURE — 2500000003 HC RX 250 WO HCPCS: Performed by: NURSE ANESTHETIST, CERTIFIED REGISTERED

## 2024-01-18 PROCEDURE — 64445 NJX AA&/STRD SCIATIC NRV IMG: CPT | Performed by: ANESTHESIOLOGY

## 2024-01-18 PROCEDURE — 3600000014 HC SURGERY LEVEL 4 ADDTL 15MIN: Performed by: ORTHOPAEDIC SURGERY

## 2024-01-18 DEVICE — DEVICE GRFT FIX FOR LIGMNT AUG ANCHR REP PEEK INT BRAC: Type: IMPLANTABLE DEVICE | Site: ANKLE | Status: FUNCTIONAL

## 2024-01-18 DEVICE — ANCHOR SUT NDL DX FIBERTAK: Type: IMPLANTABLE DEVICE | Site: ANKLE | Status: FUNCTIONAL

## 2024-01-18 DEVICE — GRAFT HUM TISS 1CC CART EXTRACELLULAR MTRX INJ BIOCART: Type: IMPLANTABLE DEVICE | Site: ANKLE | Status: FUNCTIONAL

## 2024-01-18 RX ORDER — FENTANYL CITRATE 50 UG/ML
100 INJECTION, SOLUTION INTRAMUSCULAR; INTRAVENOUS
Status: COMPLETED | OUTPATIENT
Start: 2024-01-18 | End: 2024-01-18

## 2024-01-18 RX ORDER — MIDAZOLAM HYDROCHLORIDE 2 MG/2ML
2 INJECTION, SOLUTION INTRAMUSCULAR; INTRAVENOUS
Status: COMPLETED | OUTPATIENT
Start: 2024-01-18 | End: 2024-01-18

## 2024-01-18 RX ORDER — SODIUM CHLORIDE, SODIUM LACTATE, POTASSIUM CHLORIDE, CALCIUM CHLORIDE 600; 310; 30; 20 MG/100ML; MG/100ML; MG/100ML; MG/100ML
INJECTION, SOLUTION INTRAVENOUS CONTINUOUS
Status: DISCONTINUED | OUTPATIENT
Start: 2024-01-18 | End: 2024-01-18 | Stop reason: HOSPADM

## 2024-01-18 RX ORDER — OXYCODONE HYDROCHLORIDE 5 MG/1
5 TABLET ORAL EVERY 6 HOURS PRN
Qty: 20 TABLET | Refills: 0 | Status: SHIPPED | OUTPATIENT
Start: 2024-01-18 | End: 2024-01-23

## 2024-01-18 RX ORDER — CEPHALEXIN 500 MG/1
500 CAPSULE ORAL 4 TIMES DAILY
Qty: 12 CAPSULE | Refills: 0 | Status: SHIPPED | OUTPATIENT
Start: 2024-01-18

## 2024-01-18 RX ORDER — SODIUM CHLORIDE 9 MG/ML
INJECTION, SOLUTION INTRAVENOUS PRN
Status: DISCONTINUED | OUTPATIENT
Start: 2024-01-18 | End: 2024-01-18 | Stop reason: HOSPADM

## 2024-01-18 RX ORDER — PROPOFOL 10 MG/ML
INJECTION, EMULSION INTRAVENOUS CONTINUOUS PRN
Status: DISCONTINUED | OUTPATIENT
Start: 2024-01-18 | End: 2024-01-18 | Stop reason: SDUPTHER

## 2024-01-18 RX ORDER — HYDROMORPHONE HYDROCHLORIDE 2 MG/ML
0.5 INJECTION, SOLUTION INTRAMUSCULAR; INTRAVENOUS; SUBCUTANEOUS EVERY 5 MIN PRN
Status: DISCONTINUED | OUTPATIENT
Start: 2024-01-18 | End: 2024-01-18 | Stop reason: HOSPADM

## 2024-01-18 RX ORDER — PROCHLORPERAZINE EDISYLATE 5 MG/ML
5 INJECTION INTRAMUSCULAR; INTRAVENOUS
Status: DISCONTINUED | OUTPATIENT
Start: 2024-01-18 | End: 2024-01-18 | Stop reason: HOSPADM

## 2024-01-18 RX ORDER — ACETAMINOPHEN 500 MG
1000 TABLET ORAL ONCE
Status: DISCONTINUED | OUTPATIENT
Start: 2024-01-18 | End: 2024-01-18 | Stop reason: HOSPADM

## 2024-01-18 RX ORDER — OXYCODONE HYDROCHLORIDE 5 MG/1
5 TABLET ORAL
Status: DISCONTINUED | OUTPATIENT
Start: 2024-01-18 | End: 2024-01-18 | Stop reason: HOSPADM

## 2024-01-18 RX ORDER — SODIUM CHLORIDE 0.9 % (FLUSH) 0.9 %
5-40 SYRINGE (ML) INJECTION PRN
Status: DISCONTINUED | OUTPATIENT
Start: 2024-01-18 | End: 2024-01-18 | Stop reason: HOSPADM

## 2024-01-18 RX ORDER — KETAMINE HYDROCHLORIDE 50 MG/ML
INJECTION, SOLUTION INTRAMUSCULAR; INTRAVENOUS PRN
Status: DISCONTINUED | OUTPATIENT
Start: 2024-01-18 | End: 2024-01-18 | Stop reason: SDUPTHER

## 2024-01-18 RX ORDER — ONDANSETRON 2 MG/ML
4 INJECTION INTRAMUSCULAR; INTRAVENOUS
Status: DISCONTINUED | OUTPATIENT
Start: 2024-01-18 | End: 2024-01-18 | Stop reason: HOSPADM

## 2024-01-18 RX ORDER — LIDOCAINE HYDROCHLORIDE 20 MG/ML
INJECTION, SOLUTION EPIDURAL; INFILTRATION; INTRACAUDAL; PERINEURAL PRN
Status: DISCONTINUED | OUTPATIENT
Start: 2024-01-18 | End: 2024-01-18 | Stop reason: SDUPTHER

## 2024-01-18 RX ORDER — LIDOCAINE HYDROCHLORIDE 10 MG/ML
1 INJECTION, SOLUTION INFILTRATION; PERINEURAL
Status: DISCONTINUED | OUTPATIENT
Start: 2024-01-18 | End: 2024-01-18 | Stop reason: HOSPADM

## 2024-01-18 RX ORDER — MEPERIDINE HYDROCHLORIDE 25 MG/ML
12.5 INJECTION INTRAMUSCULAR; INTRAVENOUS; SUBCUTANEOUS EVERY 5 MIN PRN
Status: DISCONTINUED | OUTPATIENT
Start: 2024-01-18 | End: 2024-01-18 | Stop reason: HOSPADM

## 2024-01-18 RX ORDER — SODIUM CHLORIDE 0.9 % (FLUSH) 0.9 %
5-40 SYRINGE (ML) INJECTION EVERY 12 HOURS SCHEDULED
Status: DISCONTINUED | OUTPATIENT
Start: 2024-01-18 | End: 2024-01-18 | Stop reason: HOSPADM

## 2024-01-18 RX ORDER — ASPIRIN 81 MG/1
81 TABLET ORAL 2 TIMES DAILY
Qty: 42 TABLET | Refills: 0 | Status: SHIPPED | OUTPATIENT
Start: 2024-01-18

## 2024-01-18 RX ADMIN — PROPOFOL 140 MCG/KG/MIN: 10 INJECTION, EMULSION INTRAVENOUS at 10:23

## 2024-01-18 RX ADMIN — KETAMINE HYDROCHLORIDE 40 MG: 50 INJECTION, SOLUTION INTRAMUSCULAR; INTRAVENOUS at 10:35

## 2024-01-18 RX ADMIN — ROPIVACAINE HYDROCHLORIDE 22 ML: 5 INJECTION, SOLUTION EPIDURAL; INFILTRATION; PERINEURAL at 09:26

## 2024-01-18 RX ADMIN — SODIUM CHLORIDE, POTASSIUM CHLORIDE, SODIUM LACTATE AND CALCIUM CHLORIDE: 600; 310; 30; 20 INJECTION, SOLUTION INTRAVENOUS at 09:00

## 2024-01-18 RX ADMIN — EPINEPHRINE 5 ML: 1 INJECTION, SOLUTION, CONCENTRATE INTRAVENOUS at 09:30

## 2024-01-18 RX ADMIN — Medication 2000 MG: at 10:23

## 2024-01-18 RX ADMIN — FENTANYL CITRATE 100 MCG: 50 INJECTION, SOLUTION INTRAMUSCULAR; INTRAVENOUS at 09:18

## 2024-01-18 RX ADMIN — ROPIVACAINE HYDROCHLORIDE 15 ML: 5 INJECTION, SOLUTION EPIDURAL; INFILTRATION; PERINEURAL at 09:30

## 2024-01-18 RX ADMIN — LIDOCAINE HYDROCHLORIDE 100 MG: 20 INJECTION, SOLUTION EPIDURAL; INFILTRATION; INTRACAUDAL; PERINEURAL at 10:23

## 2024-01-18 RX ADMIN — MIDAZOLAM 2 MG: 1 INJECTION INTRAMUSCULAR; INTRAVENOUS at 09:18

## 2024-01-18 RX ADMIN — EPINEPHRINE 8 ML: 1 INJECTION, SOLUTION, CONCENTRATE INTRAVENOUS at 09:26

## 2024-01-18 ASSESSMENT — PAIN SCALES - GENERAL
PAINLEVEL_OUTOF10: 0

## 2024-01-18 NOTE — ANESTHESIA PROCEDURE NOTES
Peripheral Block    Patient location during procedure: pre-op  Reason for block: post-op pain management and at surgeon's request  Start time: 1/18/2024 9:28 AM  End time: 1/18/2024 9:30 AM  Staffing  Performed: anesthesiologist   Anesthesiologist: Genaro Morales MD  Performed by: Genaro Morales MD  Authorized by: Genaro Morales MD    Preanesthetic Checklist  Completed: patient identified, IV checked, site marked, risks and benefits discussed, surgical/procedural consents, equipment checked, pre-op evaluation, timeout performed, anesthesia consent given, oxygen available and monitors applied/VS acknowledged  Peripheral Block   Patient position: supine  Prep: ChloraPrep  Provider prep: mask and sterile gloves  Patient monitoring: cardiac monitor, continuous pulse ox, frequent blood pressure checks, IV access, oxygen and responsive to questions  Block type: Femoral  Adductor canal  Laterality: right  Injection technique: single-shot  Guidance: ultrasound guided  Local infiltration: lidocaine  Infiltration strength: 1 %  Local infiltration: lidocaine  Dose: 3 mL    Needle   Needle type: insulated echogenic nerve stimulator needle   Needle gauge: 20 G  Needle localization: ultrasound guidance  Needle length: 10 cm  Assessment   Injection assessment: negative aspiration for heme, no paresthesia on injection, no intravascular symptoms and local visualized surrounding nerve on ultrasound  Slow fractionated injection: yes  Hemodynamics: stable  Real-time US image taken/store: yes  Outcomes: patient tolerated procedure well    Additional Notes  Risks/benefits/alternatives discussed including damage to nerve or muscle.  Needle inserted and placed in close proximity to the nerve under real time ultrasound guidance.  Ultrasound was used to visualize the spread of local anesthetic in close proximity to the nerve being blocked.  The nerve appeared anatomically normal and there were no abnormal findings. A permanent ultrasound

## 2024-01-18 NOTE — DISCHARGE INSTRUCTIONS
or medications which may reduce the effectiveness of oral contraceptives. Please consider other forms of contraception for 1 month following your procedure if you are currently using oral contraceptives as your primary form of birth control. In addition to this, we recommend continuing your oral contraceptive as prescribed, unless otherwise instructed by your physician, during this time    After general anesthesia or intravenous sedation, for 24 hours or while taking prescription Narcotics:  Limit your activities  Some people will feel drowsy or dizzy for up to a few hours after waking up.  A responsible adult needs to be with you for the next 24 hours  Do not drive and operate hazardous machinery  Do not make important personal or business decisions  Do not drink alcoholic beverages  If you have not urinated within 8 hours after discharge, and you are experiencing discomfort from urinary retention, please go to the nearest ED.  If you have sleep apnea and have a CPAP machine, please use it for all naps and sleeping.  Please use caution when taking narcotics and any of your home medications that may cause drowsiness.  *  Please give a list of your current medications to your Primary Care Provider.  *  Please update this list whenever your medications are discontinued, doses are      changed, or new medications (including over-the-counter products) are added.  *  Please carry medication information at all times in case of emergency situations.    These are general instructions for a healthy lifestyle:  No smoking/ No tobacco products/ Avoid exposure to second hand smoke  Surgeon General's Warning:  Quitting smoking now greatly reduces serious risk to your health.  Obesity, smoking, and sedentary lifestyle greatly increases your risk for illness  A healthy diet, regular physical exercise & weight monitoring are important for maintaining a healthy lifestyle    You may be retaining fluid if you have a history of heart

## 2024-01-18 NOTE — ANESTHESIA POSTPROCEDURE EVALUATION
Department of Anesthesiology  Postprocedure Note    Patient: Anaid Hoover  MRN: [id number]  YOB: 1980  Date of evaluation: 1/18/2024    Procedure Summary       Date: 01/18/24 Room / Location: [facility] OP OR 02 / [facility]    Anesthesia Start: 1018 Anesthesia Stop: 1126    Procedures:       Right ankle arthroscopy with debridement of medial talar osteochondral defect  with microfracture and bio cartilage (Right: Ankle)      open treatment of right distal fibular nonunion with lateral ankle ligament reconstruction (Right: Ankle) Diagnosis:       Right ankle instability      Osteochondral defect of talus      (Right ankle instability [M25.371])      (Osteochondral defect of talus [M95.8])    Surgeons: Jg Saleh III, MD Responsible Provider: Genaro Morales MD    Anesthesia Type: TIVA ASA Status: 3            Anesthesia Type: No value filed.    Roula Phase I: Roula Score: 8    Roula Phase II: Roula Score: 10    Anesthesia Post Evaluation    Patient location during evaluation: PACU  Patient participation: complete - patient participated  Level of consciousness: awake and alert  Airway patency: patent  Nausea & Vomiting: no nausea and no vomiting  Cardiovascular status: hemodynamically stable  Respiratory status: acceptable, nonlabored ventilation and spontaneous ventilation  Hydration status: euvolemic  Comments: BP (!) 148/79   Pulse 82   Temp 98 °F (36.7 °C) (Temporal)   Resp 15   Ht 1.651 m (5' 5\")   Wt 117.9 kg (260 lb)   LMP 12/25/2023 (Exact Date)   SpO2 98%   BMI 43.27 kg/m²     Multimodal analgesia pain management approach  Pain management: adequate and satisfactory to patient    No notable events documented.

## 2024-01-18 NOTE — H&P
Outpatient Surgery History and Physical:  Anaid Hoover was seen and examined.    CHIEF COMPLAINT:   right ankle.     PE:   Ht 1.651 m (5' 5\")   Wt 117.9 kg (260 lb)   LMP 12/25/2023 (Exact Date)   BMI 43.27 kg/m²     Heart:   Regular rhythm      Lungs:  Are clear      Past Medical History:    Past Medical History:   Diagnosis Date    Hypertension     managed with medication    Psychiatric disorder     ADD    Sleep apnea     per pt is \"mild\", does not use CPAP       Surgical History:   Past Surgical History:   Procedure Laterality Date    CHOLECYSTECTOMY      GYN      C-sec- had SA    HEENT      tonsils       Social History: Patient  reports that she has never smoked. She has never used smokeless tobacco. She reports that she does not drink alcohol and does not use drugs.    Family History:   Family History   Problem Relation Age of Onset    Hypertension Mother     Colon Cancer Neg Hx     Cancer Mother 40        Breast cancer    Heart Disease Father     Breast Cancer Mother 40    Ovarian Cancer Neg Hx        Allergies: Reviewed per EMR  Sulfa antibiotics, Garlic, Nickel, and Onion    Medications:    Prior to Admission medications    Medication Sig Start Date End Date Taking? Authorizing Provider   lisdexamfetamine (VYVANSE) 70 MG capsule Take 1 tablet by mouth. 3/31/11   Automatic Reconciliation, Ar   lisinopril (PRINIVIL;ZESTRIL) 20 MG tablet Take by mouth daily    Automatic Reconciliation, Ar   promethazine (PHENERGAN) 12.5 MG tablet Take 1 tablet by mouth every 6 hours as needed 4/5/11   Automatic Reconciliation, Ar   rOPINIRole (REQUIP) 0.5 MG tablet Take 1 tablet by mouth daily    Automatic Reconciliation, Ar       The surgery is planned for the Right ankle arthroscopy with debridement of medial talar osteochondral defect with microfracture and bio cartilage, open treatment of right distal fibular nonunion with lateral ankle ligament reconstruction .        History and physical has been reviewed. The

## 2024-01-18 NOTE — ANESTHESIA PRE PROCEDURE
infusion   IntraVENous Continuous Genaro Morales  mL/hr at 01/18/24 0900 New Bag at 01/18/24 0900   • sodium chloride flush 0.9 % injection 5-40 mL  5-40 mL IntraVENous 2 times per day Genaro Morales MD       • sodium chloride flush 0.9 % injection 5-40 mL  5-40 mL IntraVENous PRN Genaro Morales MD       • 0.9 % sodium chloride infusion   IntraVENous PRN Genaro Morales MD           Allergies:    Allergies   Allergen Reactions   • Sulfa Antibiotics Other (See Comments)     Excessive sweating and chills   • Garlic Nausea And Vomiting   • Nickel Rash   • Onion Nausea And Vomiting       Problem List:    Patient Active Problem List   Diagnosis Code   • Right ankle instability M25.371   • Osteochondral defect of talus M95.8       Past Medical History:        Diagnosis Date   • Hypertension     managed with medication   • Psychiatric disorder     ADD   • Sleep apnea     per pt is \"mild\", does not use CPAP       Past Surgical History:        Procedure Laterality Date   • CHOLECYSTECTOMY     • GYN      C-sec- had SA   • HEENT      tonsils       Social History:    Social History     Tobacco Use   • Smoking status: Never   • Smokeless tobacco: Never   Substance Use Topics   • Alcohol use: No                                Counseling given: Not Answered      Vital Signs (Current):   Vitals:    01/16/24 1316 01/18/24 0815   BP:  (!) 142/99   Pulse:  89   Resp:  17   Temp:  98 °F (36.7 °C)   TempSrc:  Oral   SpO2:  96%   Weight: 117.9 kg (260 lb)    Height: 1.651 m (5' 5\")                                               BP Readings from Last 3 Encounters:   01/18/24 (!) 142/99   10/29/22 (!) 128/96       NPO Status: Time of last liquid consumption: 2300                        Time of last solid consumption: 2300                        Date of last liquid consumption: 01/17/24                        Date of last solid food consumption: 01/17/24    BMI:   Wt Readings from Last 3 Encounters:   01/16/24 117.9 kg (260 lb)

## 2024-01-18 NOTE — OP NOTE
Operative Note    Patient:Anaid Hoover  MRN: [id number]    Date Of Surgery: 1/18/2024    Surgeon: Jg Saleh MD    Assistant Surgeon: None    Pre Op Diagnosis:  Pre-Op Diagnosis Codes:     * Right ankle instability [M25.371]     * Osteochondral defect of talus [M95.8]      Post Op Diagnosis:   same    Procedures Performed:  Right ankle arthroscopy with debridement of medial talar osteochondral defect with microfracture 1 x 1 cm, 69816  Open treatment of right talus cyst with allograft, 15889  Right lateral ankle ligament reconstruction, 86511  Open treatment of right distal fibular nonunion with internal fixation, 01725    Implants:   Implant Name Type Inv. Item Serial No.  Lot No. LRB No. Used Action   DEVICE GRFT FIX FOR LIGMNT AUG ANCHR REP PEEK INT BRAC - [id number]  DEVICE GRFT FIX FOR LIGMNT AUG ANCHR REP PEEK INT BRAC  [employer] [id number] Right 1 Implanted   ANCHOR SUT NDL DX FIBERTAK - [id number]  ANCHOR SUT NDL DX FIBERTAK  [employer] [id number] Right 2 Implanted   GRAFT HUM TISS 1CC CART EXTRACELLULAR MTRX INJ BIOCART - [id number]  GRAFT HUM TISS 1CC CART EXTRACELLULAR MTRX INJ BIOCART [id number] [employer]  Right 1 Implanted       Anesthesia:  Regional    Blood Loss:  minimal    Tourniquet:  Estimated calf 45 minutes    Pre Operative Abx:   Ancef 2g            Pre Operative Course:  Anaid Hoover is a 43 y.o. 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    Operation In Detail:  Patient was evaluated in the preoperative area.  We had a long discussion about the procedure and postoperative protocols.  The patient was then brought back to the operating room suite and placed in the operating room table.  A timeout was taken to identify the patient, procedure being performed, and laterality.  After this the patient was prepped and draped in the normal sterile fashion using a

## 2024-01-18 NOTE — ANESTHESIA PROCEDURE NOTES
Peripheral Block    Patient location during procedure: pre-op  Reason for block: post-op pain management and at surgeon's request  Start time: 1/18/2024 9:18 AM  End time: 1/18/2024 9:26 AM  Staffing  Performed: anesthesiologist   Anesthesiologist: Genaro Morales MD  Performed by: Genaro Morales MD  Authorized by: Genaro Morales MD    Preanesthetic Checklist  Completed: patient identified, IV checked, site marked, risks and benefits discussed, surgical/procedural consents, equipment checked, pre-op evaluation, timeout performed, anesthesia consent given, oxygen available and monitors applied/VS acknowledged  Peripheral Block   Patient position: supine  Prep: ChloraPrep  Provider prep: mask and sterile gloves  Patient monitoring: cardiac monitor, continuous pulse ox, oxygen, IV access, frequent blood pressure checks and responsive to questions  Block type: Sciatic  Popliteal  Laterality: right  Injection technique: single-shot  Guidance: nerve stimulator and ultrasound guided  Local infiltration: lidocaine  Infiltration strength: 1 %  Local infiltration: lidocaine  Dose: 3 mL    Needle   Needle type: insulated echogenic nerve stimulator needle   Needle gauge: 20 G  Needle localization: nerve stimulator and ultrasound guidance (minimal motor response at >0.4 mA)  Needle length: 10 cm  Assessment   Injection assessment: negative aspiration for heme, no paresthesia on injection, local visualized surrounding nerve on ultrasound and no intravascular symptoms  Slow fractionated injection: yes  Hemodynamics: stable  Real-time US image taken/store: yes  Outcomes: patient tolerated procedure well    Additional Notes  Risks/benefits/alternatives discussed including damage to nerve or muscle.  Needle inserted and placed in close proximity to the nerve under real time ultrasound guidance.  Ultrasound was used to visualize the spread of local anesthetic in close proximity to the nerve being blocked.  The nerve appeared

## 2024-01-19 ENCOUNTER — TELEPHONE (OUTPATIENT)
Dept: ORTHOPEDIC SURGERY | Age: 44
End: 2024-01-19

## 2024-01-19 ENCOUNTER — HOSPITAL ENCOUNTER (EMERGENCY)
Age: 44
Discharge: HOME | End: 2024-01-19
Attending: EMERGENCY MEDICINE
Payer: COMMERCIAL

## 2024-01-19 VITALS
DIASTOLIC BLOOD PRESSURE: 89 MMHG | HEART RATE: 70 BPM | SYSTOLIC BLOOD PRESSURE: 128 MMHG | TEMPERATURE: 98.2 F | WEIGHT: 260 LBS | HEIGHT: 65 IN | RESPIRATION RATE: 20 BRPM | BODY MASS INDEX: 43.32 KG/M2 | OXYGEN SATURATION: 100 %

## 2024-01-19 DIAGNOSIS — R42 ORTHOSTATIC DIZZINESS: Primary | ICD-10-CM

## 2024-01-19 LAB
ALBUMIN SERPL-MCNC: 3.7 G/DL (ref 3.5–5)
ALBUMIN/GLOB SERPL: 1 (ref 0.4–1.6)
ALP SERPL-CCNC: 117 U/L (ref 50–136)
ALT SERPL-CCNC: 22 U/L (ref 12–65)
ANION GAP SERPL CALC-SCNC: 5 MMOL/L (ref 2–11)
AST SERPL-CCNC: 22 U/L (ref 15–37)
BASOPHILS # BLD: 0 K/UL (ref 0–0.2)
BASOPHILS NFR BLD: 1 % (ref 0–2)
BILIRUB SERPL-MCNC: 0.1 MG/DL (ref 0.2–1.1)
BUN SERPL-MCNC: 14 MG/DL (ref 6–23)
CALCIUM SERPL-MCNC: 9 MG/DL (ref 8.3–10.4)
CHLORIDE SERPL-SCNC: 109 MMOL/L (ref 103–113)
CO2 SERPL-SCNC: 23 MMOL/L (ref 21–32)
CREAT SERPL-MCNC: 0.9 MG/DL (ref 0.6–1)
DIFFERENTIAL METHOD BLD: ABNORMAL
EOSINOPHIL # BLD: 0 K/UL (ref 0–0.8)
EOSINOPHIL NFR BLD: 0 % (ref 0.5–7.8)
ERYTHROCYTE [DISTWIDTH] IN BLOOD BY AUTOMATED COUNT: 12.5 % (ref 11.9–14.6)
GLOBULIN SER CALC-MCNC: 3.8 G/DL (ref 2.8–4.5)
GLUCOSE SERPL-MCNC: 156 MG/DL (ref 65–100)
HCT VFR BLD AUTO: 42.7 % (ref 35.8–46.3)
HGB BLD-MCNC: 14 G/DL (ref 11.7–15.4)
IMM GRANULOCYTES # BLD AUTO: 0 K/UL (ref 0–0.5)
IMM GRANULOCYTES NFR BLD AUTO: 0 % (ref 0–5)
LYMPHOCYTES # BLD: 1 K/UL (ref 0.5–4.6)
LYMPHOCYTES NFR BLD: 14 % (ref 13–44)
MCH RBC QN AUTO: 28.1 PG (ref 26.1–32.9)
MCHC RBC AUTO-ENTMCNC: 32.8 G/DL (ref 31.4–35)
MCV RBC AUTO: 85.7 FL (ref 82–102)
MONOCYTES # BLD: 0.7 K/UL (ref 0.1–1.3)
MONOCYTES NFR BLD: 9 % (ref 4–12)
NEUTS SEG # BLD: 5.5 K/UL (ref 1.7–8.2)
NEUTS SEG NFR BLD: 76 % (ref 43–78)
NRBC # BLD: 0 K/UL (ref 0–0.2)
PLATELET # BLD AUTO: 227 K/UL (ref 150–450)
PMV BLD AUTO: 10.2 FL (ref 9.4–12.3)
POTASSIUM SERPL-SCNC: 3.8 MMOL/L (ref 3.5–5.1)
PROT SERPL-MCNC: 7.5 G/DL (ref 6.3–8.2)
RBC # BLD AUTO: 4.98 M/UL (ref 4.05–5.2)
SODIUM SERPL-SCNC: 137 MMOL/L (ref 136–146)
WBC # BLD AUTO: 7.2 K/UL (ref 4.3–11.1)

## 2024-01-19 PROCEDURE — 85025 COMPLETE CBC W/AUTO DIFF WBC: CPT

## 2024-01-19 PROCEDURE — 99284 EMERGENCY DEPT VISIT MOD MDM: CPT

## 2024-01-19 PROCEDURE — 80053 COMPREHEN METABOLIC PANEL: CPT

## 2024-01-19 PROCEDURE — 2580000003 HC RX 258: Performed by: EMERGENCY MEDICINE

## 2024-01-19 RX ORDER — 0.9 % SODIUM CHLORIDE 0.9 %
1000 INTRAVENOUS SOLUTION INTRAVENOUS
Status: COMPLETED | OUTPATIENT
Start: 2024-01-19 | End: 2024-01-19

## 2024-01-19 RX ADMIN — SODIUM CHLORIDE 1000 ML: 9 INJECTION, SOLUTION INTRAVENOUS at 19:46

## 2024-01-19 ASSESSMENT — ENCOUNTER SYMPTOMS
NAUSEA: 0
CONSTIPATION: 0
ABDOMINAL PAIN: 0
BACK PAIN: 0
COUGH: 0
COLOR CHANGE: 0
VOMITING: 0
DIARRHEA: 0
SHORTNESS OF BREATH: 0

## 2024-01-19 ASSESSMENT — PAIN SCALES - GENERAL
PAINLEVEL_OUTOF10: 5
PAINLEVEL_OUTOF10: 2

## 2024-01-19 ASSESSMENT — PAIN DESCRIPTION - ORIENTATION: ORIENTATION: RIGHT

## 2024-01-19 ASSESSMENT — PAIN DESCRIPTION - LOCATION: LOCATION: LEG

## 2024-01-19 NOTE — TELEPHONE ENCOUNTER
Spoke with patients . He stated Staci's blood pressure is dropping when sitting and standing. He was told to call her PCP and let them know that she is on Oxycodone, asprin, and a blood pressure medication. He was also told that if it persists then she may need to go to the ER. He voiced understanding. HERACLIO

## 2024-01-19 NOTE — ED PROVIDER NOTES
Emergency Department Provider Note       PCP: Karoline Chapin MD   Age: 43 y.o.   Sex: female     DISPOSITION Decision To Discharge 01/19/2024 10:17:59 PM       ICD-10-CM    1. Orthostatic dizziness  R42           Medical Decision Making     Complexity of Problems Addressed:  1 or more acute illnesses that pose a threat to life or bodily function.     Data Reviewed and Analyzed:   I independently ordered and reviewed each unique test.  I reviewed external records: provider visit note from outside specialist.       I independently ordered and interpreted the ED           Discussion of management or test interpretation.  Patient with orthostatic hypotension after taking oxycodone and her blood pressure medication.  Has had time here without her oxycodone and blood pressure has improved and patient feels much better.  Will decrease the amount of pain medication she is taking and have her follow-up with PCP with increased fluids at home.      Risk of Complications and/or Morbidity of Patient Management:  Patient was discharged risks and benefits of hospitalization were considered.  Shared medical decision making was utilized in creating the patients health plan today.         Is this patient to be included in the SEP-1 core measure due to severe sepsis or septic shock? No Exclusion criteria - the patient is NOT to be included for SEP-1 Core Measure due to: Infection is not suspected      History      Patient 1 day postop right ankle surgery.  After the block wore off started taking oxycodone 5 mg every 5 hours.  She had some lightheadedness going to the restroom last night.  She had recurrent lightheadedness today around lunchtime.  She has had some orthostatic hypotension assessed at home.  Brought in for evaluation.  She skipped her last pain medication and is feeling much better here.  No longer lightheaded.    The history is provided by the patient and the spouse. No  was used.

## 2024-01-19 NOTE — TELEPHONE ENCOUNTER
is calling again. He wants to speak to someone about her blood pressure dropping. Please give him a call  before you leave.

## 2024-01-19 NOTE — TELEPHONE ENCOUNTER
She had surgery yesterday. The  is wanting to speak to someone. He has gotten her up twice and she has almost passed out. He thinks her BP is dropping. She starts sweating and just stares for a few minutes before she feels better. He is going to take her BP sitting and when she stands again. Please give him a call.

## 2024-01-19 NOTE — ED TRIAGE NOTES
Pt arrives to the ER with c/o hypotension. Pt is x 1 day post ankle surgery. Pt was light headed and dizzy today. Pt is on oxycodone [id number], and a low dose of lisinopril. Pt was positive for orthostatics at home.

## 2024-01-20 NOTE — ED NOTES
I have reviewed discharge instructions with the patient and spouse.  The patient and spouse verbalized understanding.    Patient left ED via Discharge Method: wheelchair to Home with family.      Opportunity for questions and clarification provided.       Patient given 0 scripts.         To continue your aftercare when you leave the hospital, you may receive an automated call from our care team to check in on how you are doing.  This is a free service and part of our promise to provide the best care and service to meet your aftercare needs.” If you have questions, or wish to unsubscribe from this service please call [phone].  Thank you for Choosing our [facility] Emergency Department.        Maria Knox, RN  01/19/24 8073

## 2024-02-05 ENCOUNTER — OFFICE VISIT (OUTPATIENT)
Dept: ORTHOPEDIC SURGERY | Age: 44
End: 2024-02-05
Payer: COMMERCIAL

## 2024-02-05 ENCOUNTER — HOSPITAL ENCOUNTER (OUTPATIENT)
Dept: ULTRASOUND IMAGING | Age: 44
Discharge: HOME | End: 2024-02-08

## 2024-02-05 ENCOUNTER — HOSPITAL ENCOUNTER (EMERGENCY)
Age: 44
Discharge: HOME | End: 2024-02-05
Attending: GENERAL PRACTICE
Payer: COMMERCIAL

## 2024-02-05 VITALS
HEART RATE: 84 BPM | HEIGHT: 65 IN | RESPIRATION RATE: 19 BRPM | OXYGEN SATURATION: 98 % | SYSTOLIC BLOOD PRESSURE: 116 MMHG | DIASTOLIC BLOOD PRESSURE: 79 MMHG | TEMPERATURE: 97.9 F | WEIGHT: 250 LBS | BODY MASS INDEX: 41.65 KG/M2

## 2024-02-05 DIAGNOSIS — M25.371 RIGHT ANKLE INSTABILITY: ICD-10-CM

## 2024-02-05 DIAGNOSIS — M25.371 RIGHT ANKLE INSTABILITY: Primary | ICD-10-CM

## 2024-02-05 DIAGNOSIS — I82.441 ACUTE DEEP VEIN THROMBOSIS (DVT) OF TIBIAL VEIN OF RIGHT LOWER EXTREMITY (HCC): Primary | ICD-10-CM

## 2024-02-05 PROCEDURE — L4361 PNEUMA/VAC WALK BOOT PRE OTS: HCPCS | Performed by: NURSE PRACTITIONER

## 2024-02-05 PROCEDURE — 99024 POSTOP FOLLOW-UP VISIT: CPT | Performed by: NURSE PRACTITIONER

## 2024-02-05 PROCEDURE — 99283 EMERGENCY DEPT VISIT LOW MDM: CPT

## 2024-02-05 PROCEDURE — M5002 MISC BOOT SOCK: HCPCS | Performed by: NURSE PRACTITIONER

## 2024-02-05 ASSESSMENT — LIFESTYLE VARIABLES
HOW MANY STANDARD DRINKS CONTAINING ALCOHOL DO YOU HAVE ON A TYPICAL DAY: PATIENT DOES NOT DRINK
HOW OFTEN DO YOU HAVE A DRINK CONTAINING ALCOHOL: NEVER

## 2024-02-05 NOTE — PROGRESS NOTES
The patient was prescribed a walker boot for the patient's right foot. The patient wears a size 8 shoe and I fitted them with a M size boot. The patient was fitted and instructed on the use of prescribed walker boot. I explained how to fit themselves and that the plastic flexible piece should always be on the front of the boot and secured by the Velcro straps on top. The air bladder in the boot was adjusted according to proper fit and comfort. The patient walked a short distance and acknowledged satisfaction with current fit. I also explained that they need a heel lift or a higher heeled shoe for the uninvolved LE to help normalize gait and avoid excessive low back stress/strain due to leg length inequality created from walker boot.    The patient was also prescribed and given an extra boot sock.     Patient read and signed documenting they understand and agree to [facility]'s current DME return policy.

## 2024-02-05 NOTE — ED TRIAGE NOTES
Pt arrives via pov ambulatory c/o cramping in calf. Came from US and was told she had blood clot. Arrives in boot due to recent ankle surgery. Noticed cramping last week intermittent.

## 2024-02-05 NOTE — ED NOTES
I have reviewed discharge instructions with the patient.  The patient verbalized understanding.    Patient left ED via Discharge Method: ambulatory to Home with self.    Opportunity for questions and clarification provided.       Patient given 1 scripts.         To continue your aftercare when you leave the hospital, you may receive an automated call from our care team to check in on how you are doing.  This is a free service and part of our promise to provide the best care and service to meet your aftercare needs.” If you have questions, or wish to unsubscribe from this service please call [phone].  Thank you for Choosing our [facility] Emergency Department.        Elza Garduno LPN  02/05/24 9948

## 2024-02-05 NOTE — PROGRESS NOTES
Name: Anaid Hoover  YOB: 1980  Gender: female  MRN: [id number]    Procedure Performed:  Right ankle arthroscopy with debridement of medial talar osteochondral defect with microfracture 1 x 1 cm  Open treatment of right talus cyst with allograft  Right lateral ankle ligament reconstruction  Open treatment of right distal fibular nonunion with internal fixation        Date of Procedure: 01/18/2024      Subjective: Patient notes she is done okay since her surgery.  She is very happy about getting the posterior splint removed and being able to bear weight on the affected extremity.      Physical Examination: Incisional areas appear to be healing well and show no signs of infection.  She has palpable pulses and intact sensation to the foot.  There is noted swelling to the affected extremity.  She does have a positive Homans' sign when checking for DVT.  The ankle joint is stable to talar tilt and anterior drawer testing.  Range of motion movements were stiff as expected today.  There is mild discoloration and bruising present to the foot.      Imaging:   No imaging reviewed          Assessment:   Status post right ankle arthroscopy with open treatment of talar cyst and distal fibular nonunion with Broström procedure.  We discussed progression of care today as well as expectations until next follow-up visit.  Questions and concerns were addressed, she verbalized understanding of today's conversation.  Based on today's examination I would like to send the patient for Doppler ultrasound to evaluate for DVT.      Plan:   3 This is stable chronic illness/condition  Treatment at this time: Sutures removed, Steri-Strips were placed.  Patient will be placed into a cam walker boot as a matter medical necessity where she may progressively bear weight on the affected extremity she can tolerate and swelling allows.  The patient will be sent for Doppler ultrasound to evaluate for DVT.  The affected

## 2024-02-05 NOTE — ED PROVIDER NOTES
out DVT.  Ultrasound was positive for posterior tibial DVT and she was sent here.  She denies any chest pain or shortness of breath.  She denies any other symptoms whatsoever.         Review of Systems   All other systems reviewed and are negative.      Physical Exam     Vitals signs and nursing note reviewed:  Vitals:    02/05/24 1619   BP: 114/87   Pulse: (!) 108   Resp: 16   Temp: 97.9 °F (36.6 °C)   TempSrc: Oral   SpO2: 97%   Weight: 113.4 kg (250 lb)   Height: 1.651 m (5' 5\")      Physical Exam  Constitutional:       General: She is not in acute distress.  HENT:      Head: Normocephalic and atraumatic.      Nose: Nose normal.      Mouth/Throat:      Mouth: Mucous membranes are moist.   Eyes:      Extraocular Movements: Extraocular movements intact.      Pupils: Pupils are equal, round, and reactive to light.   Cardiovascular:      Rate and Rhythm: Normal rate and regular rhythm.      Heart sounds: Normal heart sounds.   Pulmonary:      Effort: No respiratory distress.      Breath sounds: No wheezing.   Abdominal:      General: Abdomen is flat.      Palpations: Abdomen is soft.      Tenderness: There is no abdominal tenderness.   Musculoskeletal:         General: No swelling or tenderness.      Cervical back: Normal range of motion.      Comments: Mild right lateral posterior calf tenderness no obvious swelling.   Skin:     Findings: No erythema or rash.   Neurological:      General: No focal deficit present.      Mental Status: She is oriented to person, place, and time.   Psychiatric:         Mood and Affect: Mood normal.         Behavior: Behavior normal.          Procedures     Procedures    No orders of the defined types were placed in this encounter.       Medications given during this emergency department visit:  Medications - No data to display    New Prescriptions    APIXABAN STARTER PACK (ELIQUIS) 5 MG TBPK TABLET    Take 10mg (two 5mg tablets) by mouth twice a day for 7 days followed by 5mg (one 5mg

## 2024-02-20 ENCOUNTER — TELEPHONE (OUTPATIENT)
Dept: ORTHOPEDIC SURGERY | Age: 44
End: 2024-02-20

## 2024-03-04 ENCOUNTER — OFFICE VISIT (OUTPATIENT)
Dept: ORTHOPEDIC SURGERY | Age: 44
End: 2024-03-04

## 2024-03-04 DIAGNOSIS — M72.2 PLANTAR FASCIITIS: ICD-10-CM

## 2024-03-04 DIAGNOSIS — M25.371 RIGHT ANKLE INSTABILITY: Primary | ICD-10-CM

## 2024-03-04 DIAGNOSIS — M95.8 OSTEOCHONDRAL DEFECT OF TALUS: ICD-10-CM

## 2024-03-04 PROCEDURE — 99024 POSTOP FOLLOW-UP VISIT: CPT | Performed by: NURSE PRACTITIONER

## 2024-03-04 NOTE — PROGRESS NOTES
Name: Anaid Hoover  YOB: 1980  Gender: female  MRN: [id number]    Procedure Performed:  Right ankle arthroscopy with debridement of medial talar osteochondral defect with microfracture 1 x 1 cm  Open treatment of right talus cyst with allograft  Right lateral ankle ligament reconstruction  Open treatment of right distal fibular nonunion with internal fixation           Date of Procedure: 01/18/2024      Subjective: Patient notes that her ankle overall feels really good, her big concern today is that she still has the plantar fascia pain.  She is happy about progressing with her recovery and notes that overall her pain is well-managed and under control      Physical Examination: The incisional areas are all well-healed.  There are no signs of infection to the foot or ankle today.  She has palpable pulses and intact sensation to the foot.  She has no signs of DVT at this time.  The ankle joint is extremely stable to talar tilt and anterior drawer testing.  She is able to do double and single-leg toe raise without difficulty or pain.  There is mild yet noted swelling to the affected extremity.  She does have pain with palpation to the plantar fashion insertional point of the heel.  She has a negative Tinel sign over the tarsal tunnel.        Imaging:   No imaging reviewed          Assessment:   Status post right ankle arthroscopy with open treatment of talar cyst with bio cartilage and distal fibular nonunion extraction and Broström procedure.       Plan:   3 This is stable chronic illness/condition  Treatment at this time: Patient may begin to wean from the walker boot back in comfortable shoes as she can tolerate and swelling allows.  She was given information regarding a lace up ankle brace to help with this transition process if needed ultimately using the ASO brace for strenuous activities.  She will begin physical therapy for the ankle as well as the plantar fasciitis, an order was

## 2024-04-15 ENCOUNTER — TELEPHONE (OUTPATIENT)
Dept: ORTHOPEDIC SURGERY | Age: 44
End: 2024-04-15

## 2024-08-07 ENCOUNTER — HOSPITAL ENCOUNTER (OUTPATIENT)
Dept: MAMMOGRAPHY | Age: 44
Discharge: HOME | End: 2024-08-10
Payer: COMMERCIAL

## 2024-08-07 VITALS — HEIGHT: 64 IN | WEIGHT: 240 LBS | BODY MASS INDEX: 40.97 KG/M2

## 2024-08-07 DIAGNOSIS — Z12.31 ENCOUNTER FOR SCREENING MAMMOGRAM FOR BREAST CANCER: ICD-10-CM

## 2024-08-07 PROCEDURE — 77063 BREAST TOMOSYNTHESIS BI: CPT

## 2025-05-22 ENCOUNTER — HOSPITAL ENCOUNTER (EMERGENCY)
Age: 45
Discharge: HOME | End: 2025-05-22
Attending: EMERGENCY MEDICINE
Payer: COMMERCIAL

## 2025-05-22 VITALS
HEIGHT: 65 IN | WEIGHT: 250 LBS | TEMPERATURE: 97.9 F | SYSTOLIC BLOOD PRESSURE: 137 MMHG | HEART RATE: 81 BPM | DIASTOLIC BLOOD PRESSURE: 95 MMHG | BODY MASS INDEX: 41.65 KG/M2 | RESPIRATION RATE: 18 BRPM | OXYGEN SATURATION: 98 %

## 2025-05-22 DIAGNOSIS — L02.91 ABSCESS: Primary | ICD-10-CM

## 2025-05-22 PROCEDURE — 10061 I&D ABSCESS COMP/MULTIPLE: CPT

## 2025-05-22 PROCEDURE — 99283 EMERGENCY DEPT VISIT LOW MDM: CPT

## 2025-05-22 RX ORDER — DOXYCYCLINE HYCLATE 100 MG
100 TABLET ORAL 2 TIMES DAILY
Qty: 14 TABLET | Refills: 0 | Status: SHIPPED | OUTPATIENT
Start: 2025-05-22 | End: 2025-05-29

## 2025-05-22 ASSESSMENT — PAIN DESCRIPTION - PAIN TYPE: TYPE: ACUTE PAIN

## 2025-05-22 ASSESSMENT — PAIN DESCRIPTION - DESCRIPTORS: DESCRIPTORS: TENDER;SHARP

## 2025-05-22 ASSESSMENT — PAIN - FUNCTIONAL ASSESSMENT: PAIN_FUNCTIONAL_ASSESSMENT: 0-10

## 2025-05-22 ASSESSMENT — PAIN SCALES - GENERAL
PAINLEVEL_OUTOF10: 8
PAINLEVEL_OUTOF10: 0

## 2025-05-22 ASSESSMENT — PAIN DESCRIPTION - ORIENTATION: ORIENTATION: LEFT

## 2025-05-22 ASSESSMENT — PAIN DESCRIPTION - LOCATION: LOCATION: BREAST

## 2025-05-22 NOTE — ED TRIAGE NOTES
Patient arrives ambulatory to triage with complain of abscess to left breast. Patient states she noticed it 3 days ago and it has been getting worse. Area is red, swollen, and painful.

## 2025-05-23 NOTE — ED PROVIDER NOTES
Emergency Department Provider Note     Patient Name: Anaid Hoover,[id number]  Patient : 1980      ED Room: [facility]    DISPOSITION Decision To Discharge 2025 07:27:34 PM    ICD-10-CM    1. Abscess  L02.91              HISTORY OF PRESENT ILLNESS  45-year-old female presents with a 2- to 3-day history of a painful, hard, and fluctuant abscess with purulent material on the left breast, described as \"angry\" and locally warm. She denies fever, prior drainage of similar lesions, and has no history of allergic reaction, cuts, or trauma to the area. She reports a history of similar lesions on the inner thigh that never required drainage and states this episode is more severe than previous occurrences. Symptoms began after scratching the area following self-rowdy use. History obtained directly from the patient. She denies drainage prior to the procedure and reports no systemic symptoms.    PAST SURGICAL HISTORY  - Denies prior drainage of similar abscesses    PHYSICAL EXAM  Vitals and nursing note reviewed.    General: No acute distress.  Appearance: Patient is not ill-appearing, toxic-appearing or diaphoretic.   Neck: Supple No tenderness  Head: Atraumatic.   Eyes: General: No scleral icterus.  Cardiovascular: Rate and Rhythm: Normal rate and regular rhythm.   Pulmonary: Pulmonary effort is normal. No respiratory distress. No stridor. No wheezing, rhonchi or rales.   Abdominal: Abdomen is soft. There is no abdominal tenderness. There is no guarding or rebound.   Musculoskeletal:  Normal range of motion and no deformity.   Skin: On the medial side of the left breast there is a fluctuant abscess that is approximately 6 cm across 3 cm in the central outer is fluctuant it is very tender.  It does not involve the nipple and there is no drainage.  There is warmth and heat from the area.  Neurological: No focal deficit present.   Mental Status: Patient is awake and alert. Mental status is at baseline.

## (undated) DEVICE — DRAPE C ARM W54XL84IN MINI FOR OEC 6800

## (undated) DEVICE — TUBING PMP L16FT MAIN DISP FOR AR-6400 AR-6475

## (undated) DEVICE — SOLUTION IRRIG 1000ML 0.9% SOD CHL USP POUR PLAS BTL

## (undated) DEVICE — NEEDLE SPNL L3.5IN PNK HUB S STL REG WALL FIT STYL W/ QNCKE

## (undated) DEVICE — SUTURE VCRL SZ 2-0 L27IN ABSRB UD L26MM CT-2 1/2 CIR J269H

## (undated) DEVICE — DRESSING,GAUZE,XEROFORM,CURAD,1"X8",ST: Brand: CURAD

## (undated) DEVICE — SOLUTION IRRIG 3000ML 0.9% SOD CHL USP UROMATIC PLAS CONT

## (undated) DEVICE — NEEDLE HYPO 18GA L1.5IN PNK S STL HUB POLYPR SHLD REG BVL

## (undated) DEVICE — GLOVE SURG SZ 65 CRM LTX FREE POLYISOPRENE POLYMER BEAD ANTI

## (undated) DEVICE — SPLINT THMB W4XL30IN FBRGLS PD PRECUT LTWT DURABLE FAST SET

## (undated) DEVICE — FOOT DR TOLLISON & WOMACK: Brand: MEDLINE INDUSTRIES, INC.

## (undated) DEVICE — GLOVE SURG SZ 65 L12IN FNGR THK79MIL GRN LTX FREE

## (undated) DEVICE — GOWN,SIRUS,NONRNF,SETINSLV,XL,20/CS: Brand: MEDLINE

## (undated) DEVICE — SYRINGE MED 30ML STD CLR PLAS LUERLOCK TIP N CTRL DISP

## (undated) DEVICE — KIT BNE CEM ANK SHLDR EL APPLICATIONS W/ SM APPL MIX AND

## (undated) DEVICE — ZIMMER® STERILE DISPOSABLE TOURNIQUET CUFF WITH PLC, DUAL PORT, SINGLE BLADDER, 30 IN. (76 CM)

## (undated) DEVICE — GLOVE SURG SZ 8 L12IN FNGR THK79MIL GRN LTX FREE

## (undated) DEVICE — KIT INSTR DRL GUID 1.6/1.35MM GUIDWIRE DISP FIBERTAK DX

## (undated) DEVICE — BANDAGE,ELASTIC,ESMARK,STERILE,4"X9',LF: Brand: MEDLINE

## (undated) DEVICE — DISSECTOR ENDOSCP L7MM DIA3MM FOR RESECT SM JT COOLCUT